# Patient Record
Sex: FEMALE | Race: WHITE | NOT HISPANIC OR LATINO | Employment: OTHER | URBAN - METROPOLITAN AREA
[De-identification: names, ages, dates, MRNs, and addresses within clinical notes are randomized per-mention and may not be internally consistent; named-entity substitution may affect disease eponyms.]

---

## 2017-02-14 ENCOUNTER — HISTORICAL (OUTPATIENT)
Dept: RADIOLOGY | Facility: HOSPITAL | Age: 67
End: 2017-02-14

## 2017-03-08 ENCOUNTER — HISTORICAL (OUTPATIENT)
Dept: RADIOLOGY | Facility: HOSPITAL | Age: 67
End: 2017-03-08

## 2017-05-08 ENCOUNTER — HISTORICAL (OUTPATIENT)
Dept: RADIOLOGY | Facility: HOSPITAL | Age: 67
End: 2017-05-08

## 2017-05-08 LAB
BUN SERPL-MCNC: 10 MG/DL (ref 7–18)
CREAT SERPL-MCNC: 0.79 MG/DL (ref 0.6–1.3)

## 2017-05-19 ENCOUNTER — HISTORICAL (OUTPATIENT)
Dept: LAB | Facility: HOSPITAL | Age: 67
End: 2017-05-19

## 2017-05-19 LAB
ALBUMIN SERPL-MCNC: 4.2 GM/DL (ref 3.4–5)
ALP SERPL-CCNC: 68 UNIT/L (ref 46–116)
ALT SERPL-CCNC: 23 UNIT/L (ref 12–78)
AST SERPL-CCNC: 25 UNIT/L (ref 15–37)
BILIRUB SERPL-MCNC: 0.5 MG/DL (ref 0.2–1)
BILIRUBIN DIRECT+TOT PNL SERPL-MCNC: 0.13 MG/DL (ref 0–0.2)
BILIRUBIN DIRECT+TOT PNL SERPL-MCNC: 0.37 MG/DL (ref 0–0.8)
BUN SERPL-MCNC: 9 MG/DL (ref 7–18)
CALCIUM SERPL-MCNC: 9.9 MG/DL (ref 8.5–10.1)
CHLORIDE SERPL-SCNC: 106 MMOL/L (ref 98–107)
CHOLEST SERPL-MCNC: 172 MG/DL (ref 0–200)
CHOLEST/HDLC SERPL: 2.2 {RATIO} (ref 0–4)
CO2 SERPL-SCNC: 32 MMOL/L (ref 21–32)
CREAT SERPL-MCNC: 0.81 MG/DL (ref 0.6–1.3)
DEPRECATED CALCIDIOL+CALCIFEROL SERPL-MC: 36.12 NG/ML (ref 30–80)
EST. AVERAGE GLUCOSE BLD GHB EST-MCNC: 117 MG/DL
FT4I SERPL CALC-MCNC: 3
GLUCOSE SERPL-MCNC: 99 MG/DL (ref 74–106)
HBA1C MFR BLD: 5.7 % (ref 4.5–6.2)
HDLC SERPL-MCNC: 79 MG/DL (ref 40–60)
LDLC SERPL CALC-MCNC: 75 MG/DL (ref 0–129)
POTASSIUM SERPL-SCNC: 4.5 MMOL/L (ref 3.5–5.1)
PROT SERPL-MCNC: 7.3 GM/DL (ref 6.4–8.2)
SODIUM SERPL-SCNC: 144 MMOL/L (ref 136–145)
T3RU NFR SERPL: 33 % (ref 31–39)
T4 SERPL-MCNC: 9.1 MCG/DL (ref 4.7–13.3)
TRIGL SERPL-MCNC: 88 MG/DL
TSH SERPL-ACNC: 1.41 MIU/ML (ref 0.36–3.74)
VLDLC SERPL CALC-MCNC: 18 MG/DL

## 2017-06-29 ENCOUNTER — HISTORICAL (OUTPATIENT)
Dept: RADIOLOGY | Facility: HOSPITAL | Age: 67
End: 2017-06-29

## 2017-09-27 ENCOUNTER — HISTORICAL (OUTPATIENT)
Dept: LAB | Facility: HOSPITAL | Age: 67
End: 2017-09-27

## 2017-09-27 LAB
ALBUMIN SERPL-MCNC: 3.7 GM/DL (ref 3.4–5)
ALP SERPL-CCNC: 65 UNIT/L (ref 46–116)
ALT SERPL-CCNC: 27 UNIT/L (ref 12–78)
AST SERPL-CCNC: 23 UNIT/L (ref 15–37)
BILIRUB SERPL-MCNC: 0.2 MG/DL (ref 0.2–1)
BILIRUBIN DIRECT+TOT PNL SERPL-MCNC: 0.09 MG/DL (ref 0–0.2)
BILIRUBIN DIRECT+TOT PNL SERPL-MCNC: 0.11 MG/DL (ref 0–0.8)
BUN SERPL-MCNC: 9.7 MG/DL (ref 7–18)
CALCIUM SERPL-MCNC: 9.3 MG/DL (ref 8.5–10.1)
CHLORIDE SERPL-SCNC: 104 MMOL/L (ref 98–107)
CHOLEST SERPL-MCNC: 181 MG/DL (ref 0–200)
CHOLEST/HDLC SERPL: 2.2 {RATIO} (ref 0–4)
CO2 SERPL-SCNC: 31 MMOL/L (ref 21–32)
CREAT SERPL-MCNC: 0.76 MG/DL (ref 0.6–1.3)
DEPRECATED CALCIDIOL+CALCIFEROL SERPL-MC: 36.04 NG/ML (ref 30–80)
ERYTHROCYTE [DISTWIDTH] IN BLOOD BY AUTOMATED COUNT: 14.8 % (ref 11.5–17)
EST. AVERAGE GLUCOSE BLD GHB EST-MCNC: 120 MG/DL
GLUCOSE SERPL-MCNC: 101 MG/DL (ref 74–106)
HBA1C MFR BLD: 5.8 % (ref 4.5–6.2)
HCT VFR BLD AUTO: 41.3 % (ref 37–47)
HDLC SERPL-MCNC: 83 MG/DL (ref 40–60)
HGB BLD-MCNC: 13.7 GM/DL (ref 12–16)
LDLC SERPL CALC-MCNC: 82 MG/DL (ref 0–129)
MCH RBC QN AUTO: 30.8 PG (ref 27–31)
MCHC RBC AUTO-ENTMCNC: 33.3 GM/DL (ref 33–36)
MCV RBC AUTO: 92.7 FL (ref 80–94)
PLATELET # BLD AUTO: 251 X10(3)/MCL (ref 130–400)
PMV BLD AUTO: 8.8 FL (ref 7.4–10.4)
POTASSIUM SERPL-SCNC: 4.3 MMOL/L (ref 3.5–5.1)
PROT SERPL-MCNC: 7.2 GM/DL (ref 6.4–8.2)
RBC # BLD AUTO: 4.45 X10(6)/MCL (ref 4.2–5.4)
SODIUM SERPL-SCNC: 143 MMOL/L (ref 136–145)
TRIGL SERPL-MCNC: 81 MG/DL
TSH SERPL-ACNC: 1.3 MIU/ML (ref 0.36–3.74)
VLDLC SERPL CALC-MCNC: 16 MG/DL
WBC # SPEC AUTO: 6.7 X10(3)/MCL (ref 4.5–11.5)

## 2017-10-10 ENCOUNTER — HISTORICAL (OUTPATIENT)
Dept: RADIOLOGY | Facility: HOSPITAL | Age: 67
End: 2017-10-10

## 2017-10-13 ENCOUNTER — HISTORICAL (OUTPATIENT)
Dept: RADIOLOGY | Facility: HOSPITAL | Age: 67
End: 2017-10-13

## 2017-12-01 ENCOUNTER — HISTORICAL (OUTPATIENT)
Dept: RADIOLOGY | Facility: HOSPITAL | Age: 67
End: 2017-12-01

## 2018-06-12 ENCOUNTER — HISTORICAL (OUTPATIENT)
Dept: LAB | Facility: HOSPITAL | Age: 68
End: 2018-06-12

## 2018-06-12 LAB
ALBUMIN SERPL-MCNC: 3.7 GM/DL (ref 3.4–5)
ALP SERPL-CCNC: 83 UNIT/L (ref 46–116)
ALT SERPL-CCNC: 21 UNIT/L (ref 12–78)
AST SERPL-CCNC: 22 UNIT/L (ref 15–37)
BILIRUB SERPL-MCNC: 0.3 MG/DL (ref 0.2–1)
BILIRUBIN DIRECT+TOT PNL SERPL-MCNC: 0.12 MG/DL (ref 0–0.2)
BILIRUBIN DIRECT+TOT PNL SERPL-MCNC: 0.18 MG/DL (ref 0–0.8)
BUN SERPL-MCNC: 10.3 MG/DL (ref 7–18)
CALCIUM SERPL-MCNC: 9.4 MG/DL (ref 8.5–10.1)
CHLORIDE SERPL-SCNC: 103 MMOL/L (ref 98–107)
CHOLEST SERPL-MCNC: 170 MG/DL (ref 0–200)
CHOLEST/HDLC SERPL: 2.2 {RATIO} (ref 0–4)
CO2 SERPL-SCNC: 33.8 MMOL/L (ref 21–32)
CREAT SERPL-MCNC: 0.73 MG/DL (ref 0.6–1.3)
CREAT/UREA NIT SERPL: 14
DEPRECATED CALCIDIOL+CALCIFEROL SERPL-MC: 32 NG/ML (ref 30–80)
GLUCOSE SERPL-MCNC: 104 MG/DL (ref 74–106)
HDLC SERPL-MCNC: 78 MG/DL (ref 40–60)
LDLC SERPL CALC-MCNC: 77 MG/DL (ref 0–129)
MAGNESIUM SERPL-MCNC: 2 MG/DL (ref 1.8–2.4)
POTASSIUM SERPL-SCNC: 4.5 MMOL/L (ref 3.5–5.1)
PROT SERPL-MCNC: 7.4 GM/DL (ref 6.4–8.2)
SODIUM SERPL-SCNC: 142 MMOL/L (ref 136–145)
T3RU NFR SERPL: 35 % (ref 31–39)
T4 SERPL-MCNC: 9.5 MCG/DL (ref 4.7–13.3)
TRIGL SERPL-MCNC: 73 MG/DL
TSH SERPL-ACNC: 1.17 MIU/ML (ref 0.36–3.74)
VLDLC SERPL CALC-MCNC: 15 MG/DL

## 2018-06-27 ENCOUNTER — HISTORICAL (OUTPATIENT)
Dept: ADMINISTRATIVE | Facility: HOSPITAL | Age: 68
End: 2018-06-27

## 2018-07-25 ENCOUNTER — HISTORICAL (OUTPATIENT)
Dept: ADMINISTRATIVE | Facility: HOSPITAL | Age: 68
End: 2018-07-25

## 2018-08-30 ENCOUNTER — HISTORICAL (OUTPATIENT)
Dept: RADIOLOGY | Facility: HOSPITAL | Age: 68
End: 2018-08-30

## 2019-02-07 ENCOUNTER — HISTORICAL (OUTPATIENT)
Dept: LAB | Facility: HOSPITAL | Age: 69
End: 2019-02-07

## 2019-02-07 LAB
ALBUMIN SERPL-MCNC: 3.8 GM/DL (ref 3.4–5)
ALBUMIN/GLOB SERPL: 1.1 RATIO (ref 1.1–2)
ALP SERPL-CCNC: 70 UNIT/L (ref 46–116)
ALT SERPL-CCNC: 22 UNIT/L (ref 12–78)
AST SERPL-CCNC: 21 UNIT/L (ref 15–37)
BILIRUB SERPL-MCNC: 0.3 MG/DL (ref 0.2–1)
BILIRUBIN DIRECT+TOT PNL SERPL-MCNC: 0.09 MG/DL (ref 0–0.2)
BILIRUBIN DIRECT+TOT PNL SERPL-MCNC: 0.21 MG/DL (ref 0–0.8)
BUN SERPL-MCNC: 9.5 MG/DL (ref 7–18)
CALCIUM SERPL-MCNC: 9.4 MG/DL (ref 8.5–10.1)
CHLORIDE SERPL-SCNC: 104 MMOL/L (ref 98–107)
CHOLEST SERPL-MCNC: 156 MG/DL (ref 0–200)
CHOLEST/HDLC SERPL: 2.4 {RATIO} (ref 0–4)
CO2 SERPL-SCNC: 24 MMOL/L (ref 21–32)
CREAT SERPL-MCNC: 0.73 MG/DL (ref 0.6–1.3)
CRP SERPL-MCNC: 0.9 MG/DL (ref 0–0.9)
DEPRECATED CALCIDIOL+CALCIFEROL SERPL-MC: 51.93 NG/ML (ref 30–80)
GLOBULIN SER-MCNC: 3.5 GM/DL (ref 2.4–3.5)
GLUCOSE SERPL-MCNC: 100 MG/DL (ref 74–106)
HDLC SERPL-MCNC: 65 MG/DL (ref 40–60)
LDLC SERPL CALC-MCNC: 73 MG/DL (ref 0–129)
POTASSIUM SERPL-SCNC: 4.2 MMOL/L (ref 3.5–5.1)
PROT SERPL-MCNC: 7.3 GM/DL (ref 6.4–8.2)
SODIUM SERPL-SCNC: 142 MMOL/L (ref 136–145)
T3RU NFR SERPL: 33 % (ref 31–39)
T4 SERPL-MCNC: 8.3 MCG/DL (ref 4.7–13.3)
TRIGL SERPL-MCNC: 89 MG/DL
TSH SERPL-ACNC: 0.27 MIU/ML (ref 0.36–3.74)
VIT B12 SERPL-MCNC: >2000 PG/ML (ref 193–986)
VLDLC SERPL CALC-MCNC: 18 MG/DL

## 2019-06-21 ENCOUNTER — HISTORICAL (OUTPATIENT)
Dept: LAB | Facility: HOSPITAL | Age: 69
End: 2019-06-21

## 2019-06-21 LAB
ABS NEUT (OLG): 5.78 X10(3)/MCL (ref 2.1–9.2)
ALBUMIN SERPL-MCNC: 3.9 GM/DL (ref 3.4–5)
ALP SERPL-CCNC: 71 UNIT/L (ref 46–116)
ALT SERPL-CCNC: 33 UNIT/L (ref 12–78)
APPEARANCE, UA: CLEAR
APTT PPP: 22.4 SECOND(S) (ref 24.5–34.9)
AST SERPL-CCNC: 29 UNIT/L (ref 15–37)
BACTERIA SPEC CULT: ABNORMAL
BASOPHILS # BLD AUTO: 0 X10(3)/MCL (ref 0–0.2)
BASOPHILS NFR BLD AUTO: 0 %
BILIRUB SERPL-MCNC: 0.3 MG/DL (ref 0.2–1)
BILIRUB UR QL STRIP: NEGATIVE
BILIRUBIN DIRECT+TOT PNL SERPL-MCNC: 0.1 MG/DL (ref 0–0.2)
BILIRUBIN DIRECT+TOT PNL SERPL-MCNC: 0.2 MG/DL (ref 0–0.8)
BUN SERPL-MCNC: 9.6 MG/DL (ref 7–18)
CALCIUM SERPL-MCNC: 9.6 MG/DL (ref 8.5–10.1)
CHLORIDE SERPL-SCNC: 104 MMOL/L (ref 98–107)
CO2 SERPL-SCNC: 34 MMOL/L (ref 21–32)
COLOR UR: YELLOW
CREAT SERPL-MCNC: 0.71 MG/DL (ref 0.6–1.3)
CREAT/UREA NIT SERPL: 14
EOSINOPHIL # BLD AUTO: 0.1 X10(3)/MCL (ref 0–0.9)
EOSINOPHIL NFR BLD AUTO: 1 %
ERYTHROCYTE [DISTWIDTH] IN BLOOD BY AUTOMATED COUNT: 15.2 % (ref 11.5–17)
EST. AVERAGE GLUCOSE BLD GHB EST-MCNC: 126 MG/DL
GLUCOSE (UA): NEGATIVE
GLUCOSE SERPL-MCNC: 95 MG/DL (ref 74–106)
HBA1C MFR BLD: 6 % (ref 4.5–6.2)
HCT VFR BLD AUTO: 39.8 % (ref 37–47)
HGB BLD-MCNC: 12.8 GM/DL (ref 12–16)
HGB UR QL STRIP: NEGATIVE
INR PPP: 0.97 (ref 2–3)
KETONES UR QL STRIP: NEGATIVE
LEUKOCYTE ESTERASE UR QL STRIP: ABNORMAL
LYMPHOCYTES # BLD AUTO: 1.4 X10(3)/MCL (ref 0.6–4.6)
LYMPHOCYTES NFR BLD AUTO: 17 %
MCH RBC QN AUTO: 30.1 PG (ref 27–31)
MCHC RBC AUTO-ENTMCNC: 32.2 GM/DL (ref 33–36)
MCV RBC AUTO: 93.6 FL (ref 80–94)
MONOCYTES # BLD AUTO: 0.7 X10(3)/MCL (ref 0.1–1.3)
MONOCYTES NFR BLD AUTO: 9 %
NEUTROPHILS # BLD AUTO: 5.78 X10(3)/MCL (ref 1.4–7.9)
NEUTROPHILS NFR BLD AUTO: 73 %
NITRITE UR QL STRIP: NEGATIVE
PH UR STRIP: 8.5 [PH] (ref 5–9)
PLATELET # BLD AUTO: 263 X10(3)/MCL (ref 130–400)
PMV BLD AUTO: 10.8 FL (ref 9.4–12.4)
POTASSIUM SERPL-SCNC: 4.8 MMOL/L (ref 3.5–5.1)
PROT SERPL-MCNC: 7.6 GM/DL (ref 6.4–8.2)
PROT UR QL STRIP: NEGATIVE
PROTHROMBIN TIME: 10.2 SECOND(S) (ref 9.3–11.4)
RBC # BLD AUTO: 4.25 X10(6)/MCL (ref 4.2–5.4)
RBC #/AREA URNS HPF: ABNORMAL /[HPF]
SODIUM SERPL-SCNC: 142 MMOL/L (ref 136–145)
SP GR UR STRIP: 1.01 (ref 1–1.03)
SQUAMOUS EPITHELIAL, UA: ABNORMAL
TSH SERPL-ACNC: 0.75 MIU/ML (ref 0.36–3.74)
UROBILINOGEN UR STRIP-ACNC: 0.2
WBC # SPEC AUTO: 8 X10(3)/MCL (ref 4.5–11.5)
WBC #/AREA URNS HPF: ABNORMAL /HPF

## 2019-09-03 ENCOUNTER — HISTORICAL (OUTPATIENT)
Dept: RADIOLOGY | Facility: HOSPITAL | Age: 69
End: 2019-09-03

## 2019-11-19 ENCOUNTER — HISTORICAL (OUTPATIENT)
Dept: RADIOLOGY | Facility: HOSPITAL | Age: 69
End: 2019-11-19

## 2020-02-28 ENCOUNTER — HISTORICAL (OUTPATIENT)
Dept: LAB | Facility: HOSPITAL | Age: 70
End: 2020-02-28

## 2020-02-28 LAB
APPEARANCE, UA: CLEAR
BACTERIA SPEC CULT: ABNORMAL
BILIRUB UR QL STRIP: NEGATIVE
COLOR UR: YELLOW
GLUCOSE (UA): NEGATIVE
HGB UR QL STRIP: NEGATIVE
KETONES UR QL STRIP: NEGATIVE
LEUKOCYTE ESTERASE UR QL STRIP: ABNORMAL
NITRITE UR QL STRIP: NEGATIVE
PH UR STRIP: 6.5 [PH] (ref 5–9)
PROT UR QL STRIP: NEGATIVE
RBC #/AREA URNS HPF: ABNORMAL /[HPF]
SP GR UR STRIP: 1.01 (ref 1–1.03)
SQUAMOUS EPITHELIAL, UA: ABNORMAL
UROBILINOGEN UR STRIP-ACNC: 0.2
WBC #/AREA URNS HPF: ABNORMAL /HPF

## 2020-07-16 ENCOUNTER — HISTORICAL (OUTPATIENT)
Dept: RADIOLOGY | Facility: HOSPITAL | Age: 70
End: 2020-07-16

## 2020-07-16 LAB
BUN SERPL-MCNC: 6 MG/DL (ref 7–18)
CREAT SERPL-MCNC: 0.62 MG/DL (ref 0.6–1.3)

## 2020-09-14 ENCOUNTER — HISTORICAL (OUTPATIENT)
Dept: LAB | Facility: HOSPITAL | Age: 70
End: 2020-09-14

## 2020-09-14 LAB
ALBUMIN SERPL-MCNC: 4.2 GM/DL (ref 3.4–5)
ALP SERPL-CCNC: 64 UNIT/L (ref 46–116)
ALT SERPL-CCNC: 30 UNIT/L (ref 12–78)
AST SERPL-CCNC: 29 UNIT/L (ref 15–37)
BILIRUB SERPL-MCNC: 0.4 MG/DL (ref 0.2–1)
BILIRUBIN DIRECT+TOT PNL SERPL-MCNC: 0.12 MG/DL (ref 0–0.2)
BILIRUBIN DIRECT+TOT PNL SERPL-MCNC: 0.28 MG/DL (ref 0–0.8)
BUN SERPL-MCNC: 8.4 MG/DL (ref 7–18)
CALCIUM SERPL-MCNC: 9.9 MG/DL (ref 8.5–10.1)
CHLORIDE SERPL-SCNC: 101 MMOL/L (ref 98–107)
CHOLEST SERPL-MCNC: 220 MG/DL (ref 0–200)
CHOLEST/HDLC SERPL: 2.8 {RATIO} (ref 0–4)
CO2 SERPL-SCNC: 37 MMOL/L (ref 21–32)
CREAT SERPL-MCNC: 0.83 MG/DL (ref 0.6–1.3)
CREAT/UREA NIT SERPL: 10 MG/DL (ref 12–14)
DEPRECATED CALCIDIOL+CALCIFEROL SERPL-MC: 78.1 NG/ML (ref 6.6–49.9)
ERYTHROCYTE [DISTWIDTH] IN BLOOD BY AUTOMATED COUNT: 14.2 % (ref 11.5–17)
EST. AVERAGE GLUCOSE BLD GHB EST-MCNC: 126 MG/DL
FT4I SERPL CALC-MCNC: 3.5
GLUCOSE SERPL-MCNC: 105 MG/DL (ref 74–106)
HBA1C MFR BLD: 6 % (ref 4.5–6.2)
HCT VFR BLD AUTO: 45.9 % (ref 37–47)
HDLC SERPL-MCNC: 78 MG/DL (ref 40–60)
HGB BLD-MCNC: 15.1 GM/DL (ref 12–16)
LDLC SERPL CALC-MCNC: 122 MG/DL (ref 0–129)
MCH RBC QN AUTO: 31.7 PG (ref 27–31)
MCHC RBC AUTO-ENTMCNC: 32.9 GM/DL (ref 33–36)
MCV RBC AUTO: 96.4 FL (ref 80–94)
PLATELET # BLD AUTO: 234 X10(3)/MCL (ref 130–400)
PMV BLD AUTO: 11 FL (ref 9.4–12.4)
POTASSIUM SERPL-SCNC: 4.4 MMOL/L (ref 3.5–5.1)
PROT SERPL-MCNC: 8 GM/DL (ref 6.4–8.2)
RBC # BLD AUTO: 4.76 X10(6)/MCL (ref 4.2–5.4)
SODIUM SERPL-SCNC: 141 MMOL/L (ref 136–145)
T3RU NFR SERPL: 33 % (ref 31–39)
T4 SERPL-MCNC: 10.6 MCG/DL (ref 4.7–13.3)
TRIGL SERPL-MCNC: 99 MG/DL
TSH SERPL-ACNC: 1.55 MIU/ML (ref 0.36–3.74)
VLDLC SERPL CALC-MCNC: 20 MG/DL
WBC # SPEC AUTO: 4.9 X10(3)/MCL (ref 4.5–11.5)

## 2020-10-29 ENCOUNTER — HISTORICAL (OUTPATIENT)
Dept: RADIOLOGY | Facility: HOSPITAL | Age: 70
End: 2020-10-29

## 2021-11-05 ENCOUNTER — HISTORICAL (OUTPATIENT)
Dept: RADIOLOGY | Facility: HOSPITAL | Age: 71
End: 2021-11-05

## 2022-04-11 ENCOUNTER — HISTORICAL (OUTPATIENT)
Dept: LAB | Facility: HOSPITAL | Age: 72
End: 2022-04-11

## 2022-04-11 LAB
ALBUMIN SERPL-MCNC: 3.8 G/DL (ref 3.4–4.8)
ALBUMIN/GLOB SERPL: 1.1 {RATIO} (ref 1.1–2)
ALP SERPL-CCNC: 53 U/L (ref 40–150)
ALT SERPL-CCNC: 21 U/L (ref 0–55)
AST SERPL-CCNC: 29 U/L (ref 5–34)
BILIRUB SERPL-MCNC: 0.3 MG/DL
BILIRUBIN DIRECT+TOT PNL SERPL-MCNC: 0.1 (ref 0–0.8)
BILIRUBIN DIRECT+TOT PNL SERPL-MCNC: 0.2 (ref 0–0.5)
BUN SERPL-MCNC: 7.3 MG/DL (ref 9.8–20.1)
CALCIUM SERPL-MCNC: 9.7 MG/DL (ref 8.7–10.5)
CHLORIDE SERPL-SCNC: 101 MMOL/L (ref 98–107)
CHOLEST SERPL-MCNC: 188 MG/DL
CHOLEST/HDLC SERPL: 3 {RATIO} (ref 0–5)
CO2 SERPL-SCNC: 32 MMOL/L (ref 23–31)
CREAT SERPL-MCNC: 0.72 MG/DL (ref 0.55–1.02)
DEPRECATED CALCIDIOL+CALCIFEROL SERPL-MC: 59.9 NG/ML (ref 30–80)
ERYTHROCYTE [DISTWIDTH] IN BLOOD BY AUTOMATED COUNT: 13.6 % (ref 11.5–17)
EST. AVERAGE GLUCOSE BLD GHB EST-MCNC: 119.8 MG/DL
GLOBULIN SER-MCNC: 3.6 G/DL (ref 2.4–3.5)
GLUCOSE SERPL-MCNC: 97 MG/DL (ref 82–115)
HBA1C MFR BLD: 5.8 %
HCT VFR BLD AUTO: 45.4 % (ref 37–47)
HDLC SERPL-MCNC: 69 MG/DL (ref 35–60)
HEMOLYSIS INTERF INDEX SERPL-ACNC: 6
HEMOLYSIS INTERF INDEX SERPL-ACNC: 6
HGB BLD-MCNC: 14.2 G/DL (ref 12–16)
ICTERIC INTERF INDEX SERPL-ACNC: 0
LDLC SERPL CALC-MCNC: 104 MG/DL (ref 50–140)
LIPEMIC INTERF INDEX SERPL-ACNC: 3
MAGNESIUM SERPL-MCNC: 2.2 MG/DL (ref 1.6–2.6)
MCH RBC QN AUTO: 30.4 PG (ref 27–31)
MCHC RBC AUTO-ENTMCNC: 31.3 G/DL (ref 33–36)
MCV RBC AUTO: 97.2 FL (ref 80–94)
PLATELET # BLD AUTO: 205 10*3/UL (ref 130–400)
PMV BLD AUTO: 10.8 FL (ref 9.4–12.4)
POTASSIUM SERPL-SCNC: 4.4 MMOL/L (ref 3.5–5.1)
PROT SERPL-MCNC: 7.4 G/DL (ref 5.8–7.6)
RBC # BLD AUTO: 4.67 10*6/UL (ref 4.2–5.4)
SODIUM SERPL-SCNC: 143 MMOL/L (ref 136–145)
T3RU NFR SERPL: 36.75 % (ref 31–39)
T4 SERPL-MCNC: 9.54 UG/DL (ref 4.87–11.72)
TRIGL SERPL-MCNC: 77 MG/DL (ref 37–140)
TSH SERPL-ACNC: 0.86 M[IU]/L (ref 0.35–4.94)
VLDLC SERPL CALC-MCNC: 15 MG/DL
WBC # SPEC AUTO: 4.3 10*3/UL (ref 4.5–11.5)

## 2022-04-30 NOTE — OP NOTE
Patient:   Omiara Brito            MRN: [id number]            FIN: [id number]               Age:   68 years     Sex:  Female     :  1950   Associated Diagnoses:   None   Author:   Cheikh Figueroa MD      Preoperative Diagnosis: Cataract Left eye    Postoperative Diagnosis: Cataract Left eye    Procedure: Phacoemulsification with intaocular lens implantations Left eye    Surgeon: Cheikh Figueroa MD    Assistant: LISA Wheat     Anestheisa: Topical    Complications: None    The patient was brought into the operating suite, where the patient was correctly identified as was the operative eye via timeout.  The patient was prepped and draped in a sterile ophthalmic fashion.  A lid speculum was placed in the operative eye and the microscope was brought into place.  A 1.0mm paracentesis was then made at 6 o'clock.  The anterior chamber was filled with Endocoat.  A temporal clear corneal incision was made with a 2.4 mm keratome.  A 6 mm corneal marking ring was used to frank the cornea centered over the visual axis.  A 5.00 mm continuous curvilinear capsulorhexis was fashioned using a cystotome and microcapsular forceps.  Hydrodissection and hydrodelineation was performed with upreserved 1% Xylocaine.  The nucleus was then phacoemulsified with the Abbott phacoemulsification hand-piece with a total of 3 EFX.  The cortex was then removed with the Mathew I/A hand-piece. An DAMON lens model ZCB00 with a power of 18.5 was placed in the capsular bag.  The Helon was then removed from the eye with the Mathew I/A hand piece.  The anterior chamber was inflated and the wounds were hydrated with BSS.  The wounds were checked with Weck-Sunita sponges and found to be watertight.  The lid speculum was removed and topical antibiotics were placed on the operative eye.  The patient was brought to PACU in good condition.        2018 @ [facility]

## 2022-04-30 NOTE — OP NOTE
Patient:   Omaira Brito            MRN: [id number]            FIN: [id number]               Age:   68 years     Sex:  Female     :  1950   Associated Diagnoses:   None   Author:   Cheikh Figueroa MD      Preoperative Diagnosis: Cataract Right eye    Postoperative Diagnosis: Cataract Right eye    Procedure: Phacoemulsification with intaocular lens implantations Right eye    Surgeon: Cheikh Figueroa MD    Assistant: LISA Wheat    Anestheisa: Topical    Complications: None    The patient was brought into the operating suite, where the patient was correctly identified as was the operative eye via timeout.  The patient was prepped and draped in a sterile ophthalmic fashion.  A lid speculum was placed in the operative eye and the microscope was brought into place.  A 1.0mm paracentesis was then made at 12 o'clock.  The anterior chamber was filled with Endocoat.  A temporal clear corneal incision was made with a 2.4 mm keratome.  A 6 mm corneal marking ring was used to frank the cornea centered over the visual axis.  A 5.00 mm continuous curvilinear capsulorhexis was fashioned using a cystotome and microcapsular forceps.  Hydrodissection and hydrodelineation was performed with upreserved 1% Xylocaine.  The nucleus was then phacoemulsified with the Abbott phacoemulsification hand-piece with a total of 11 EFX.  The cortex was then removed with the Mathew I/A hand-piece. An DAMON lens model ZCB00 with a power of 19.0 was placed in the capsular bag.  The Helon was then removed from the eye with the Mathew I/A hand piece. The anterior chamber was inflated and the wounds were hydrated with BSS.  The wounds were checked with Weck-Sunita sponges and found to be watertight.  The lid speculum was removed and topical antibiotics were placed on the operative eye.  The patient was brought to PACU in good condition.        2018 @ [facility]

## 2022-08-29 ENCOUNTER — OFFICE VISIT (OUTPATIENT)
Dept: ORTHOPEDICS | Facility: CLINIC | Age: 72
End: 2022-08-29
Payer: MEDICARE

## 2022-08-29 VITALS — HEIGHT: 59 IN | WEIGHT: 158 LBS | BODY MASS INDEX: 31.85 KG/M2

## 2022-08-29 DIAGNOSIS — M19.011 PRIMARY OSTEOARTHRITIS OF RIGHT SHOULDER: ICD-10-CM

## 2022-08-29 DIAGNOSIS — M75.41 SHOULDER IMPINGEMENT SYNDROME, RIGHT: ICD-10-CM

## 2022-08-29 DIAGNOSIS — M25.511 RIGHT SHOULDER PAIN, UNSPECIFIED CHRONICITY: Primary | ICD-10-CM

## 2022-08-29 PROCEDURE — 20610 DRAIN/INJ JOINT/BURSA W/O US: CPT | Mod: RT,,, | Performed by: ORTHOPAEDIC SURGERY

## 2022-08-29 PROCEDURE — 20610 LARGE JOINT ASPIRATION/INJECTION: R SUBACROMIAL BURSA: ICD-10-PCS | Mod: RT,,, | Performed by: ORTHOPAEDIC SURGERY

## 2022-08-29 PROCEDURE — 99203 OFFICE O/P NEW LOW 30 MIN: CPT | Mod: 25,,, | Performed by: ORTHOPAEDIC SURGERY

## 2022-08-29 PROCEDURE — 99203 PR OFFICE/OUTPT VISIT, NEW, LEVL III, 30-44 MIN: ICD-10-PCS | Mod: 25,,, | Performed by: ORTHOPAEDIC SURGERY

## 2022-08-29 RX ORDER — MECLIZINE HCL 25MG 25 MG/1
25 TABLET, CHEWABLE ORAL 3 TIMES DAILY PRN
COMMUNITY

## 2022-08-29 RX ORDER — LEVOTHYROXINE SODIUM 75 UG/1
75 TABLET ORAL
COMMUNITY

## 2022-08-29 RX ORDER — PANTOPRAZOLE SODIUM 40 MG/1
40 TABLET, DELAYED RELEASE ORAL 2 TIMES DAILY
COMMUNITY

## 2022-08-29 RX ORDER — NAPROXEN 500 MG/1
500 TABLET ORAL DAILY
COMMUNITY
Start: 2022-04-08

## 2022-08-29 RX ORDER — FLUTICASONE PROPIONATE 50 MCG
1 SPRAY, SUSPENSION (ML) NASAL
COMMUNITY

## 2022-08-29 RX ORDER — BUSPIRONE HYDROCHLORIDE 15 MG/1
15 TABLET ORAL DAILY
COMMUNITY

## 2022-08-29 RX ORDER — BETAMETHASONE SODIUM PHOSPHATE AND BETAMETHASONE ACETATE 3; 3 MG/ML; MG/ML
12 INJECTION, SUSPENSION INTRA-ARTICULAR; INTRALESIONAL; INTRAMUSCULAR; SOFT TISSUE
Status: DISCONTINUED | OUTPATIENT
Start: 2022-08-29 | End: 2022-08-29 | Stop reason: HOSPADM

## 2022-08-29 RX ORDER — DOXEPIN HYDROCHLORIDE 25 MG/1
25 CAPSULE ORAL NIGHTLY
COMMUNITY
End: 2023-08-02

## 2022-08-29 RX ORDER — SIMVASTATIN 40 MG/1
40 TABLET, FILM COATED ORAL NIGHTLY
COMMUNITY
Start: 2022-03-03

## 2022-08-29 RX ORDER — LIDOCAINE HYDROCHLORIDE 20 MG/ML
3 INJECTION, SOLUTION INFILTRATION; PERINEURAL
Status: DISCONTINUED | OUTPATIENT
Start: 2022-08-29 | End: 2022-08-29 | Stop reason: HOSPADM

## 2022-08-29 RX ORDER — VENLAFAXINE HYDROCHLORIDE 150 MG/1
150 CAPSULE, EXTENDED RELEASE ORAL DAILY
COMMUNITY

## 2022-08-29 RX ORDER — HYOSCYAMINE SULFATE 0.12 MG/1
0.12 TABLET SUBLINGUAL DAILY PRN
COMMUNITY

## 2022-08-29 RX ORDER — CHLORPHENIRAMINE MALEATE 4 MG
4 TABLET ORAL DAILY
COMMUNITY
End: 2023-08-02

## 2022-08-29 RX ORDER — ESTRADIOL 1 MG/1
1 TABLET ORAL DAILY
COMMUNITY
Start: 2022-03-10

## 2022-08-29 RX ORDER — BALSALAZIDE DISODIUM 750 MG/1
1500 CAPSULE ORAL 3 TIMES DAILY
COMMUNITY

## 2022-08-29 RX ADMIN — BETAMETHASONE SODIUM PHOSPHATE AND BETAMETHASONE ACETATE 12 MG: 3; 3 INJECTION, SUSPENSION INTRA-ARTICULAR; INTRALESIONAL; INTRAMUSCULAR; SOFT TISSUE at 02:08

## 2022-08-29 RX ADMIN — LIDOCAINE HYDROCHLORIDE 3 MG: 20 INJECTION, SOLUTION INFILTRATION; PERINEURAL at 02:08

## 2022-08-29 NOTE — PROGRESS NOTES
Subjective:    CC: Pain of the Right Shoulder and Pain (R shoulder pain - previous RTC sx with dr diaz in 1980's - pt states that she has a lot of pain when lifting her arm - she states that she has pain with any outward motion of her shoulder - td)       HPI:  Patient comes in today for her 1st visit.  Patient complains of right shoulder pain for the last several years.  She states it is gradually getting worse.  She is not interested in a shoulder replacement.  He has had multiple surgeries in the past on her right shoulder.  She denies any new or specific trauma, she continues to have pain especially with overhead activities, limiting her motion.    ROS: Refer to HPI for pertinent ROS. All other 12 point systems negative.    Objective:    Physical Exam:  Patient is well-nourished and well-developed, in no apparent distress, pleasant and cooperative. Examination of the right upper extremity compartments are soft and warm.  Skin is intact. There are no signs or symptoms of DVT or infection.   Patient is tender to palpation along the  anterior lateral aspect .  Patient is able to forward flex and abduct to 110.  Positive Pennington and Neers, positive empty can, negative drop arm test. Negative sulcus sign. Stable to stressing. Neurovascularly intact distally.    Images:  X-rays two views of the right shoulder demonstrates severe glenohumeral arthritis with elevated humeral head shoulder impingement. Images Reviewed and discussed with patient.    Assessment:  1. Right shoulder pain, unspecified chronicity  - X-ray Shoulder 2 or More Views Right; Future    2. Primary osteoarthritis of right shoulder    3. Shoulder impingement syndrome, right  - Large Joint Aspiration/Injection: R subacromial bursa        Plan:  At this time we discussed her physical exam and x-ray findings.  We have discussed various treatment options.  She tolerated her subacromial injection very well the right shoulder.  We have discussed some  shoulder exercises, like see back in 6 weeks to see how she is progressing.    Follow UP: No follow-ups on file.    Large Joint Aspiration/Injection: R subacromial bursa    Date/Time: 8/29/2022 2:45 PM  Performed by: Sean Eric MD  Authorized by: Sean Eric MD     Consent Done?:  Yes (Verbal)  Indications:  Pain  Site marked: the procedure site was marked    Timeout: prior to procedure the correct patient, procedure, and site was verified    Prep: patient was prepped and draped in usual sterile fashion    Approach:  Posterior  Location:  Shoulder  Site:  R subacromial bursa  Medications:  12 mg betamethasone acetate-betamethasone sodium phosphate 6 mg/mL; 3 mg LIDOcaine HCL 20 mg/ml (2%) 20 mg/mL (2 %)  Patient tolerance:  Patient tolerated the procedure well with no immediate complications

## 2022-08-29 NOTE — PROCEDURES
Large Joint Aspiration/Injection: R subacromial bursa    Date/Time: 8/29/2022 2:45 PM  Performed by: Sean Eric MD  Authorized by: Sean Eric MD     Consent Done?:  Yes (Verbal)  Indications:  Pain  Site marked: the procedure site was marked    Timeout: prior to procedure the correct patient, procedure, and site was verified    Prep: patient was prepped and draped in usual sterile fashion    Approach:  Posterior  Location:  Shoulder  Site:  R subacromial bursa  Medications:  12 mg betamethasone acetate-betamethasone sodium phosphate 6 mg/mL; 3 mg LIDOcaine HCL 20 mg/ml (2%) 20 mg/mL (2 %)  Patient tolerance:  Patient tolerated the procedure well with no immediate complications

## 2022-09-07 ENCOUNTER — OFFICE VISIT (OUTPATIENT)
Dept: ORTHOPEDICS | Facility: CLINIC | Age: 72
End: 2022-09-07
Payer: MEDICARE

## 2022-09-07 VITALS
WEIGHT: 158 LBS | DIASTOLIC BLOOD PRESSURE: 67 MMHG | SYSTOLIC BLOOD PRESSURE: 128 MMHG | HEIGHT: 59 IN | BODY MASS INDEX: 31.85 KG/M2 | TEMPERATURE: 98 F | HEART RATE: 72 BPM

## 2022-09-07 DIAGNOSIS — M75.41 SHOULDER IMPINGEMENT SYNDROME, RIGHT: ICD-10-CM

## 2022-09-07 DIAGNOSIS — M19.011 PRIMARY OSTEOARTHRITIS OF RIGHT SHOULDER: Primary | ICD-10-CM

## 2022-09-07 PROCEDURE — 99213 OFFICE O/P EST LOW 20 MIN: CPT | Mod: ,,, | Performed by: ORTHOPAEDIC SURGERY

## 2022-09-07 PROCEDURE — 99213 PR OFFICE/OUTPT VISIT, EST, LEVL III, 20-29 MIN: ICD-10-PCS | Mod: ,,, | Performed by: ORTHOPAEDIC SURGERY

## 2022-09-07 NOTE — PROGRESS NOTES
Subjective:    CC: Pain of the Right Shoulder (Right shoulder pain Injection on 8/29/22 did not help Takes naproxen helps alittle  Home exercises unable to do increase pain )       HPI:  Patient returns today for repeat exam.  Patient injection in her right shoulder, subacromial space 2 weeks ago.  Patient had minimal relief.  She continues to have some pain.  She is not interested in surgery.    ROS: Refer to [facility] for pertinent ROS. All other 12 point systems negative.    Objective:    Physical Exam:  Patient is well-nourished and well-developed, in no apparent distress, pleasant and cooperative. Examination of the right upper extremity compartments are soft and warm.  Skin is intact. There are no signs or symptoms of DVT or infection.   Patient is tender to palpation along the  lateral aspect .  Patient is able to forward flex and abduct to 130.  Positive Pennington and Neers, positive empty can, questionable drop arm test. Negative sulcus sign. Stable to stressing. Neurovascularly intact distally.    Images: . Images Reviewed and discussed with patient.    Assessment:  1. Primary osteoarthritis of right shoulder  - Ambulatory referral/consult to Physical/Occupational Therapy; Future    2. Shoulder impingement syndrome, right  - Ambulatory referral/consult to Physical/Occupational Therapy; Future        Plan:  At this time we discussed her physical exam and previous imaging findings.  We have discussed her significant arthritis, elevated humeral head subacromial impingement.  We have discussed various treatment options., we will start with formal therapy, like see her back for his scheduled appointment 4 weeks    Follow UP: No follow-ups on file.

## 2022-10-12 ENCOUNTER — OFFICE VISIT (OUTPATIENT)
Dept: ORTHOPEDICS | Facility: CLINIC | Age: 72
End: 2022-10-12
Payer: MEDICARE

## 2022-10-12 VITALS
SYSTOLIC BLOOD PRESSURE: 120 MMHG | HEART RATE: 79 BPM | HEIGHT: 59 IN | BODY MASS INDEX: 31.85 KG/M2 | WEIGHT: 158 LBS | TEMPERATURE: 98 F | DIASTOLIC BLOOD PRESSURE: 69 MMHG

## 2022-10-12 DIAGNOSIS — M19.011 PRIMARY OSTEOARTHRITIS OF RIGHT SHOULDER: Primary | ICD-10-CM

## 2022-10-12 DIAGNOSIS — M75.41 SHOULDER IMPINGEMENT SYNDROME, RIGHT: ICD-10-CM

## 2022-10-12 PROCEDURE — 99213 PR OFFICE/OUTPT VISIT, EST, LEVL III, 20-29 MIN: ICD-10-PCS | Mod: ,,, | Performed by: ORTHOPAEDIC SURGERY

## 2022-10-12 PROCEDURE — 99213 OFFICE O/P EST LOW 20 MIN: CPT | Mod: ,,, | Performed by: ORTHOPAEDIC SURGERY

## 2022-10-12 NOTE — PROGRESS NOTES
Subjective:    CC: Pain of the Right Shoulder (R shoulder inj 8/29/22 - pt states that her shoulder is about 80% better. She states that she is in physical therapy. Taking Toradol and flexaril for pain)       HPI:  Patient returns today for repeat exam.  Patient states injection helped a lot.  She has been going to physical therapy making good progress.    ROS: Refer to HPI for pertinent ROS. All other 12 point systems negative.    Objective:    Physical Exam:  Patient is well-nourished and well-developed, in no apparent distress, pleasant and cooperative. Examination of the right upper extremity compartments are soft and warm.  Skin is intact. There are no signs or symptoms of DVT or infection.   Patient is tender to palpation along the  anterolateral aspect .  Patient is able to forward flex and abduct to 150.  Positive Pennington and Neers, positive empty can, negative drop arm test. Negative sulcus sign. Stable to stressing. Neurovascularly intact distally.    Images: . Images Reviewed and discussed with patient.    Assessment:  1. Primary osteoarthritis of right shoulder  - Ambulatory referral/consult to Physical/Occupational Therapy; Future    2. Shoulder impingement syndrome, right  - Ambulatory referral/consult to Physical/Occupational Therapy; Future        Plan:  At this time we discussed her physical exam and previous imaging findings.  She will continue physical therapy to regain her strength and motion.  I would like see her back in 2 months to see how she is progressing.    Follow UP: No follow-ups on file.

## 2022-11-02 DIAGNOSIS — Z12.31 SCREENING MAMMOGRAM FOR BREAST CANCER: Primary | ICD-10-CM

## 2022-11-21 ENCOUNTER — HOSPITAL ENCOUNTER (OUTPATIENT)
Dept: RADIOLOGY | Facility: HOSPITAL | Age: 72
Discharge: HOME | End: 2022-11-21
Attending: OBSTETRICS & GYNECOLOGY
Payer: MEDICARE

## 2022-11-21 DIAGNOSIS — Z12.31 SCREENING MAMMOGRAM FOR BREAST CANCER: ICD-10-CM

## 2022-11-21 PROCEDURE — 77063 MAMMO DIGITAL SCREENING BILAT WITH TOMO: ICD-10-PCS | Mod: 26,,, | Performed by: RADIOLOGY

## 2022-11-21 PROCEDURE — 77063 BREAST TOMOSYNTHESIS BI: CPT | Mod: TC

## 2022-11-21 PROCEDURE — 77067 MAMMO DIGITAL SCREENING BILAT WITH TOMO: ICD-10-PCS | Mod: 26,,, | Performed by: RADIOLOGY

## 2022-11-21 PROCEDURE — 77067 SCR MAMMO BI INCL CAD: CPT | Mod: TC

## 2022-11-21 PROCEDURE — 77067 SCR MAMMO BI INCL CAD: CPT | Mod: 26,,, | Performed by: RADIOLOGY

## 2022-11-21 PROCEDURE — 77063 BREAST TOMOSYNTHESIS BI: CPT | Mod: 26,,, | Performed by: RADIOLOGY

## 2022-11-22 ENCOUNTER — LAB VISIT (OUTPATIENT)
Dept: LAB | Facility: HOSPITAL | Age: 72
End: 2022-11-22
Attending: INTERNAL MEDICINE
Payer: MEDICARE

## 2022-11-22 DIAGNOSIS — Z82.49 FAMILY HISTORY OF ISCHEMIC HEART DISEASE: ICD-10-CM

## 2022-11-22 DIAGNOSIS — E03.9 MYXEDEMA HEART DISEASE: ICD-10-CM

## 2022-11-22 DIAGNOSIS — Z00.00 ROUTINE GENERAL MEDICAL EXAMINATION AT A HEALTH CARE FACILITY: ICD-10-CM

## 2022-11-22 DIAGNOSIS — I51.9 MYXEDEMA HEART DISEASE: ICD-10-CM

## 2022-11-22 DIAGNOSIS — E55.9 AVITAMINOSIS D: ICD-10-CM

## 2022-11-22 DIAGNOSIS — E78.00 PURE HYPERCHOLESTEROLEMIA: ICD-10-CM

## 2022-11-22 DIAGNOSIS — Z79.899 ENCOUNTER FOR LONG-TERM (CURRENT) USE OF OTHER MEDICATIONS: ICD-10-CM

## 2022-11-22 DIAGNOSIS — R53.83 FATIGUE, UNSPECIFIED TYPE: Primary | ICD-10-CM

## 2022-11-22 LAB
ALBUMIN SERPL-MCNC: 3.7 GM/DL (ref 3.4–4.8)
ALBUMIN/GLOB SERPL: 1 RATIO (ref 1.1–2)
ALP SERPL-CCNC: 53 UNIT/L (ref 40–150)
ALT SERPL-CCNC: 25 UNIT/L (ref 0–55)
AST SERPL-CCNC: 31 UNIT/L (ref 5–34)
BILIRUBIN DIRECT+TOT PNL SERPL-MCNC: 0.4 MG/DL
BUN SERPL-MCNC: 9.8 MG/DL (ref 9.8–20.1)
CALCIUM SERPL-MCNC: 9.8 MG/DL (ref 8.4–10.2)
CHLORIDE SERPL-SCNC: 100 MMOL/L (ref 98–107)
CHOLEST SERPL-MCNC: 181 MG/DL
CHOLEST/HDLC SERPL: 2 {RATIO} (ref 0–5)
CO2 SERPL-SCNC: 32 MMOL/L (ref 23–31)
CREAT SERPL-MCNC: 0.72 MG/DL (ref 0.55–1.02)
DEPRECATED CALCIDIOL+CALCIFEROL SERPL-MC: 56.1 NG/ML (ref 30–80)
ERYTHROCYTE [DISTWIDTH] IN BLOOD BY AUTOMATED COUNT: 13.7 % (ref 11.5–17)
EST. AVERAGE GLUCOSE BLD GHB EST-MCNC: 114 MG/DL
GFR SERPLBLD CREATININE-BSD FMLA CKD-EPI: >60 MLS/MIN/1.73/M2
GLOBULIN SER-MCNC: 3.6 GM/DL (ref 2.4–3.5)
GLUCOSE SERPL-MCNC: 113 MG/DL (ref 82–115)
HBA1C MFR BLD: 5.6 %
HCT VFR BLD AUTO: 44.1 % (ref 37–47)
HDLC SERPL-MCNC: 80 MG/DL (ref 35–60)
HGB BLD-MCNC: 14 GM/DL (ref 12–16)
LDLC SERPL CALC-MCNC: 85 MG/DL (ref 50–140)
MCH RBC QN AUTO: 30.8 PG (ref 27–31)
MCHC RBC AUTO-ENTMCNC: 31.7 MG/DL (ref 33–36)
MCV RBC AUTO: 97.1 FL (ref 80–94)
PLATELET # BLD AUTO: 239 X10(3)/MCL (ref 130–400)
PMV BLD AUTO: 9.5 FL (ref 7.4–10.4)
POTASSIUM SERPL-SCNC: 4.3 MMOL/L (ref 3.5–5.1)
PROT SERPL-MCNC: 7.3 GM/DL (ref 5.8–7.6)
RBC # BLD AUTO: 4.54 X10(6)/MCL (ref 4.2–5.4)
SODIUM SERPL-SCNC: 142 MMOL/L (ref 136–145)
TRIGL SERPL-MCNC: 82 MG/DL (ref 37–140)
TSH SERPL-ACNC: 0.83 UIU/ML (ref 0.35–4.94)
VLDLC SERPL CALC-MCNC: 16 MG/DL
WBC # SPEC AUTO: 5.3 X10(3)/MCL (ref 4.5–11.5)

## 2022-11-22 PROCEDURE — 82306 VITAMIN D 25 HYDROXY: CPT

## 2022-11-22 PROCEDURE — 80061 LIPID PANEL: CPT

## 2022-11-22 PROCEDURE — 84443 ASSAY THYROID STIM HORMONE: CPT

## 2022-11-22 PROCEDURE — 80053 COMPREHEN METABOLIC PANEL: CPT

## 2022-11-22 PROCEDURE — 85027 COMPLETE CBC AUTOMATED: CPT

## 2022-11-22 PROCEDURE — 83036 HEMOGLOBIN GLYCOSYLATED A1C: CPT

## 2022-11-22 PROCEDURE — 36415 COLL VENOUS BLD VENIPUNCTURE: CPT

## 2022-12-07 ENCOUNTER — OFFICE VISIT (OUTPATIENT)
Dept: ORTHOPEDICS | Facility: CLINIC | Age: 72
End: 2022-12-07
Payer: MEDICARE

## 2022-12-07 VITALS — HEIGHT: 59 IN | WEIGHT: 158 LBS | BODY MASS INDEX: 31.85 KG/M2

## 2022-12-07 DIAGNOSIS — M75.41 SHOULDER IMPINGEMENT SYNDROME, RIGHT: ICD-10-CM

## 2022-12-07 DIAGNOSIS — M19.011 PRIMARY OSTEOARTHRITIS OF RIGHT SHOULDER: Primary | ICD-10-CM

## 2022-12-07 PROCEDURE — 99213 OFFICE O/P EST LOW 20 MIN: CPT | Mod: ,,, | Performed by: ORTHOPAEDIC SURGERY

## 2022-12-07 PROCEDURE — 99213 PR OFFICE/OUTPT VISIT, EST, LEVL III, 20-29 MIN: ICD-10-PCS | Mod: ,,, | Performed by: ORTHOPAEDIC SURGERY

## 2022-12-07 NOTE — PROGRESS NOTES
"Subjective:    CC: Pain of the Right Shoulder and Pain (R shoulder pain - pt states that the therapy is no longer helping. the pain is effecting her sleep - pt is taking tramadol for the pain - td)       HPI:  Patient returns today for repeat exam.  She is presently with family.  Patient continues to have worsening pain about her right shoulder.  She is had a previous rotator cuff repair with Dr. Agudelo 20+ years ago.  She also has a significant history of severe arthritis in her right shoulder.  She remains to retained good motion though she has worsening pain.  She has failed conservative treatment including therapy and injections, she interested in surgical intervention.    ROS: Refer to [facility] for pertinent ROS. All other 12 point systems negative.    Objective:  Vitals:    12/07/22 1115   Weight: 71.7 kg (158 lb)   Height: 4' 11" (1.499 m)        Physical Exam:  Right upper extremity compartment soft and warm.  Skin is intact.  There is no signs symptoms of DVT or infection.  Her incision is well healed she is able to forward flex and abduct to 150° there is crepitance with motion she is stable to stressing negative sulcus negative for instability, positive Pennington sign, neurovascular intact distally.    Images: . Images Reviewed and discussed with patient.    Assessment:  1. Primary osteoarthritis of right shoulder    2. Shoulder impingement syndrome, right        Plan:  At this time we discussed her physical exam and previous imaging findings.  We have discussed her impingement, underlying arthritic changes.  We have discussed a reverse total shoulder.  We have discussed consultation with 1 of my partners.  She is interested in this.  We will set this up at her convenience.    Follow UP: No follow-ups on file.                "

## 2022-12-12 ENCOUNTER — OFFICE VISIT (OUTPATIENT)
Dept: ORTHOPEDICS | Facility: CLINIC | Age: 72
End: 2022-12-12
Payer: MEDICARE

## 2022-12-12 DIAGNOSIS — M12.811 ROTATOR CUFF ARTHROPATHY OF RIGHT SHOULDER: Primary | ICD-10-CM

## 2022-12-12 PROCEDURE — 99213 OFFICE O/P EST LOW 20 MIN: CPT | Mod: ,,, | Performed by: ORTHOPAEDIC SURGERY

## 2022-12-12 PROCEDURE — 99213 PR OFFICE/OUTPT VISIT, EST, LEVL III, 20-29 MIN: ICD-10-PCS | Mod: ,,, | Performed by: ORTHOPAEDIC SURGERY

## 2022-12-12 NOTE — PROGRESS NOTES
Chief Complaint:   Chief Complaint   Patient presents with    Right Shoulder - Pain    Pain     Referral from DR. Eric for Rt reverse total shoulder.pt states in pain, pt taking tramadol for pain pt tried ice but doesnt work.        Consulting Physician: No ref. provider found    History of present illness:    she is a pleasant 72 y.o. year old female who has had a longstanding history of right shoulder pain.  She is had a history of a rotator cuff repair in the 1980s.  She is had persistent pain around the shoulder.  She noticed the pain worse with activity.  It is somewhat better with rest.  She is tried anti-inflammatory medicines and prescription strengthening pain medicine without relief.  She is tried home exercise program now for greater than 3 months.  Unfortunately her pain and disability have persisted.    Past Medical History:   Diagnosis Date    HLD (hyperlipidemia)     Osteoarthritis     Thyroid disease        Past Surgical History:   Procedure Laterality Date    CHOLECYSTECTOMY         Current Outpatient Medications   Medication Sig    balsalazide (COLAZAL) 750 mg capsule Take 1,500 mg by mouth.    busPIRone (BUSPAR) 15 MG tablet Take 15 mg by mouth 3 (three) times daily.    chlorpheniramine (CHLOR-TRIMETON) 4 mg tablet Take 4 mg by mouth.    doxepin (SINEQUAN) 25 MG capsule Take 25 mg by mouth every evening.    estradioL (ESTRACE) 1 MG tablet Take 1 mg by mouth once daily.    fluticasone propionate (FLONASE) 50 mcg/actuation nasal spray 1 spray by Nasal route.    hyoscyamine (LEVSIN/SL) 0.125 mg Subl Take 0.125 mg by mouth daily as needed.    levothyroxine (SYNTHROID) 75 MCG tablet Take 75 mcg by mouth before breakfast.    meclizine (ANTIVERT) 25 MG tablet Take 25 mg by mouth 3 (three) times daily as needed.    naproxen (NAPROSYN) 500 MG tablet Take 500 mg by mouth once daily.    pantoprazole (PROTONIX) 40 MG tablet Take 40 mg by mouth 2 (two) times daily.    simvastatin (ZOCOR) 40 MG tablet Take  40 mg by mouth once daily.    venlafaxine (EFFEXOR-XR) 150 MG Cp24 Take 150 mg by mouth once daily.     No current facility-administered medications for this visit.       Review of patient's allergies indicates:   Allergen Reactions    Amoxicillin-pot clavulanate Diarrhea and Other (See Comments)     Cause erosion, severe bleeding       Codeine Itching    Diphenhydramine hcl Other (See Comments)     Other reaction(s): HYPERACTIVE  Hyperactivity, restless      Hydrocodone-acetaminophen Itching     mild       Family History   Problem Relation Age of Onset    Cancer Father        Social History     Socioeconomic History    Marital status: Single   Tobacco Use    Smoking status: Never    Smokeless tobacco: Never   Substance and Sexual Activity    Alcohol use: Yes    Drug use: Never    Sexual activity: Not Currently       Review of Systems:    Constitution:   Denies chills, fever, and sweats.  HENT:   Denies headaches or blurry vision.  Cardiovascular:  Denies chest pain or irregular heart beat.  Respiratory:   Denies cough or shortness of breath.  Gastrointestinal:  Denies abdominal pain, nausea, or vomiting.  Musculoskeletal:   Denies muscle cramps.  Neurological:   Denies dizziness or focal weakness.  Psychiatric/Behavior: Normal mental status.  Hematology/Lymph:  Denies bleeding problem or easy bruising/bleeding.  Skin:    Denies rash or suspicious lesions.    Examination:    Vital Signs:    Vitals:    12/12/22 1327   PainSc:   6       There is no height or weight on file to calculate BMI.    Constitution:   Well-developed, well nourished patient in no acute distress.  Neurological:   Alert and oriented x 3 and cooperative to examination.     Psychiatric/Behavior: Normal mental status.  Respiratory:   No shortness of breath.  Eyes:    Extraoccular muscles intact  Skin:    No scars, rash or suspicious lesions.    MSK:   Shoulder Exam:                   Right        Left  Skin:                                   Normal      Normal  AC joint tenderness:           None         None  Forward Flexion:                  90            180  Abduction:                            90           180  External Rotation:                 40              80  Internal Rotation:                80             80  Supraspinatus stress test: +           Neg  Hawkin's Impingement:     Neg           Neg  Neer Impingement:            Neg           Neg  Apprehension:                   Neg           Neg  [place]'s:                             +           Neg  Speed's test:                       +            Neg  Strength:  External Rotation:           5/5                5/5  Lift Off/belly press:          5/5                5/5    N-V status:                   Intact             Intact    C-spine: Normal ROM, NT      Imaging: X-rays show rotator cuff arthropathy       Assessment: Rotator cuff arthropathy of right shoulder  -     CT Shoulder Without Contrast Right; Future; Expected date: 12/12/2022        Plan:  CT scan, joint replacement class and we will give her a handout on some exercises to do preoperatively.  I will see her back couple weeks for surgical planning.

## 2022-12-28 ENCOUNTER — HOSPITAL ENCOUNTER (OUTPATIENT)
Dept: RADIOLOGY | Facility: HOSPITAL | Age: 72
Discharge: HOME | End: 2022-12-28
Attending: ORTHOPAEDIC SURGERY
Payer: MEDICARE

## 2022-12-28 ENCOUNTER — OFFICE VISIT (OUTPATIENT)
Dept: ORTHOPEDICS | Facility: CLINIC | Age: 72
End: 2022-12-28
Payer: MEDICARE

## 2022-12-28 DIAGNOSIS — M12.811 ROTATOR CUFF ARTHROPATHY OF RIGHT SHOULDER: Primary | ICD-10-CM

## 2022-12-28 DIAGNOSIS — Z01.810 PREOP CARDIOVASCULAR EXAM: ICD-10-CM

## 2022-12-28 DIAGNOSIS — M12.811 ROTATOR CUFF ARTHROPATHY OF RIGHT SHOULDER: ICD-10-CM

## 2022-12-28 PROCEDURE — 99213 PR OFFICE/OUTPT VISIT, EST, LEVL III, 20-29 MIN: ICD-10-PCS | Mod: ,,, | Performed by: ORTHOPAEDIC SURGERY

## 2022-12-28 PROCEDURE — 71046 X-RAY EXAM CHEST 2 VIEWS: CPT | Mod: TC

## 2022-12-28 PROCEDURE — 99213 OFFICE O/P EST LOW 20 MIN: CPT | Mod: ,,, | Performed by: ORTHOPAEDIC SURGERY

## 2022-12-28 RX ORDER — GABAPENTIN 100 MG/1
300 CAPSULE ORAL
Status: CANCELLED | OUTPATIENT
Start: 2022-12-28

## 2022-12-28 RX ORDER — GABAPENTIN 100 MG/1
600 CAPSULE ORAL
Status: CANCELLED | OUTPATIENT
Start: 2022-12-28

## 2022-12-28 RX ORDER — SCOLOPAMINE TRANSDERMAL SYSTEM 1 MG/1
1 PATCH, EXTENDED RELEASE TRANSDERMAL ONCE AS NEEDED
Status: CANCELLED | OUTPATIENT
Start: 2022-12-28 | End: 2034-05-26

## 2022-12-28 RX ORDER — SODIUM CHLORIDE, SODIUM GLUCONATE, SODIUM ACETATE, POTASSIUM CHLORIDE AND MAGNESIUM CHLORIDE 30; 37; 368; 526; 502 MG/100ML; MG/100ML; MG/100ML; MG/100ML; MG/100ML
INJECTION, SOLUTION INTRAVENOUS CONTINUOUS
Status: CANCELLED | OUTPATIENT
Start: 2022-12-28

## 2022-12-28 RX ORDER — ACETAMINOPHEN 500 MG
1000 TABLET ORAL
Status: CANCELLED | OUTPATIENT
Start: 2022-12-28

## 2022-12-28 RX ORDER — KETOROLAC TROMETHAMINE 10 MG/1
10 TABLET, FILM COATED ORAL ONCE
Status: CANCELLED | OUTPATIENT
Start: 2022-12-28 | End: 2023-01-02

## 2022-12-28 RX ORDER — TRANEXAMIC ACID 650 MG/1
1950 TABLET ORAL
Status: CANCELLED | OUTPATIENT
Start: 2022-12-28 | End: 2022-12-28

## 2022-12-28 NOTE — PROGRESS NOTES
Chief Complaint:   Chief Complaint   Patient presents with    Right Shoulder - Results    Results     right shoulder MRI results, pt states nothing has changed since her last appt. Unable to obatin vitals       Consulting Physician: No ref. provider found    History of present illness:    she is a pleasant 72 y.o. year old female who has had a longstanding history of right shoulder pain.  She is had a history of a rotator cuff repair in the 1980s.  She is had persistent pain around the shoulder.  She noticed the pain worse with activity.  It is somewhat better with rest.  She is tried anti-inflammatory medicines and prescription strengthening pain medicine without relief.  She is tried home exercise program now for greater than 3 months.  Unfortunately her pain and disability have persisted.    She returns today status post CT scan.    Past Medical History:   Diagnosis Date    HLD (hyperlipidemia)     Osteoarthritis     Thyroid disease        Past Surgical History:   Procedure Laterality Date    CHOLECYSTECTOMY         Current Outpatient Medications   Medication Sig    balsalazide (COLAZAL) 750 mg capsule Take 1,500 mg by mouth.    busPIRone (BUSPAR) 15 MG tablet Take 15 mg by mouth 3 (three) times daily.    chlorpheniramine (CHLOR-TRIMETON) 4 mg tablet Take 4 mg by mouth.    doxepin (SINEQUAN) 25 MG capsule Take 25 mg by mouth every evening.    estradioL (ESTRACE) 1 MG tablet Take 1 mg by mouth once daily.    fluticasone propionate (FLONASE) 50 mcg/actuation nasal spray 1 spray by Nasal route.    hyoscyamine (LEVSIN/SL) 0.125 mg Subl Take 0.125 mg by mouth daily as needed.    levothyroxine (SYNTHROID) 75 MCG tablet Take 75 mcg by mouth before breakfast.    meclizine (ANTIVERT) 25 MG tablet Take 25 mg by mouth 3 (three) times daily as needed.    naproxen (NAPROSYN) 500 MG tablet Take 500 mg by mouth once daily.    pantoprazole (PROTONIX) 40 MG tablet Take 40 mg by mouth 2 (two) times daily.    simvastatin (ZOCOR) 40  MG tablet Take 40 mg by mouth once daily.    venlafaxine (EFFEXOR-XR) 150 MG Cp24 Take 150 mg by mouth once daily.     No current facility-administered medications for this visit.       Review of patient's allergies indicates:   Allergen Reactions    Amoxicillin-pot clavulanate Diarrhea and Other (See Comments)     Cause erosion, severe bleeding       Codeine Itching    Diphenhydramine hcl Other (See Comments)     Other reaction(s): HYPERACTIVE  Hyperactivity, restless      Hydrocodone-acetaminophen Itching     mild       Family History   Problem Relation Age of Onset    Cancer Father        Social History     Socioeconomic History    Marital status: Single   Tobacco Use    Smoking status: Never    Smokeless tobacco: Never   Substance and Sexual Activity    Alcohol use: Yes    Drug use: Never    Sexual activity: Not Currently       Review of Systems:    Constitution:   Denies chills, fever, and sweats.  HENT:   Denies headaches or blurry vision.  Cardiovascular:  Denies chest pain or irregular heart beat.  Respiratory:   Denies cough or shortness of breath.  Gastrointestinal:  Denies abdominal pain, nausea, or vomiting.  Musculoskeletal:   Denies muscle cramps.  Neurological:   Denies dizziness or focal weakness.  Psychiatric/Behavior: Normal mental status.  Hematology/Lymph:  Denies bleeding problem or easy bruising/bleeding.  Skin:    Denies rash or suspicious lesions.    Examination:    Vital Signs:    There were no vitals filed for this visit.      There is no height or weight on file to calculate BMI.    Constitution:   Well-developed, well nourished patient in no acute distress.  Neurological:   Alert and oriented x 3 and cooperative to examination.     Psychiatric/Behavior: Normal mental status.  Respiratory:   No shortness of breath.  Eyes:    Extraoccular muscles intact  Skin:    No scars, rash or suspicious lesions.    MSK:   Shoulder Exam:                   Right        Left  Skin:                                    Normal     Normal  AC joint tenderness:           None         None  Forward Flexion:                  90            180  Abduction:                            90           180  External Rotation:                 40              80  Internal Rotation:                80             80  Supraspinatus stress test: +           Neg  Hawkin's Impingement:     Neg           Neg  Neer Impingement:            Neg           Neg  Apprehension:                   Neg           Neg  [place]'s:                             +           Neg  Speed's test:                       +            Neg  Strength:  External Rotation:           5/5                5/5  Lift Off/belly press:          5/5                5/5    N-V status:                   Intact             Intact    C-spine: Normal ROM, NT      Imaging: X-rays show rotator cuff arthropathy       Assessment: Rotator cuff arthropathy of right shoulder        Plan:  I have recommended surgical intervention:  Right reverse total shoulder arthroplasty.  We discussed the details of the procedure and expected postoperative course.  We discussed the benefits of surgery which we did decrease her pain and increase her function.  We discussed the risks of surgery which is small but could be significant if she has continued pain, stiffness, or infection.  After discussion she would like to proceed.  Plans for surgery January 24

## 2023-01-03 ENCOUNTER — PATIENT MESSAGE (OUTPATIENT)
Dept: SURGERY | Facility: HOSPITAL | Age: 73
End: 2023-01-03
Payer: MEDICARE

## 2023-01-18 ENCOUNTER — ANESTHESIA EVENT (OUTPATIENT)
Dept: SURGERY | Facility: HOSPITAL | Age: 73
End: 2023-01-18
Payer: MEDICARE

## 2023-01-18 RX ORDER — CALCIUM CARBONATE 500(1250)
1 TABLET,CHEWABLE ORAL 2 TIMES DAILY
COMMUNITY

## 2023-01-18 RX ORDER — VITAMIN E 268 MG
400 CAPSULE ORAL NIGHTLY
COMMUNITY

## 2023-01-18 RX ORDER — ASCORBIC ACID 500 MG
500 TABLET ORAL NIGHTLY
COMMUNITY

## 2023-01-18 RX ORDER — WITCH HAZEL 50 %
2500 PADS, MEDICATED (EA) TOPICAL
COMMUNITY

## 2023-01-18 RX ORDER — SUCRALFATE 1 G/1
1 TABLET ORAL 2 TIMES DAILY
COMMUNITY

## 2023-01-18 RX ORDER — DOXEPIN HYDROCHLORIDE 100 MG/1
200 CAPSULE ORAL NIGHTLY
COMMUNITY

## 2023-01-18 RX ORDER — CETIRIZINE HYDROCHLORIDE 10 MG/1
10 TABLET ORAL DAILY
COMMUNITY

## 2023-01-18 RX ORDER — ACETAMINOPHEN 500 MG
5000 TABLET ORAL DAILY
COMMUNITY

## 2023-01-19 ENCOUNTER — TELEPHONE (OUTPATIENT)
Dept: ORTHOPEDICS | Facility: CLINIC | Age: 73
End: 2023-01-19
Payer: MEDICARE

## 2023-01-19 NOTE — TELEPHONE ENCOUNTER
FYI: Having surgery 1/24/23- Rt RTSA.  Called to let us know she had a tooth pulled on Tuesday and has abcess. She is on antibiotics. Okay to proceed.

## 2023-01-22 ENCOUNTER — PATIENT MESSAGE (OUTPATIENT)
Dept: ADMINISTRATIVE | Facility: OTHER | Age: 73
End: 2023-01-22
Payer: MEDICARE

## 2023-01-24 ENCOUNTER — ANESTHESIA (OUTPATIENT)
Dept: SURGERY | Facility: HOSPITAL | Age: 73
End: 2023-01-24
Payer: MEDICARE

## 2023-02-04 ENCOUNTER — PATIENT MESSAGE (OUTPATIENT)
Dept: ADMINISTRATIVE | Facility: OTHER | Age: 73
End: 2023-02-04
Payer: MEDICARE

## 2023-02-06 ENCOUNTER — PATIENT MESSAGE (OUTPATIENT)
Dept: ADMINISTRATIVE | Facility: OTHER | Age: 73
End: 2023-02-06
Payer: MEDICARE

## 2023-02-06 NOTE — H&P
Chief Complaint:    Right Shoulder - Results    Results       right shoulder MRI results, pt states nothing has changed since her last appt. Unable to obatin vitals          History of present illness:    she is a pleasant 72 y.o. year old female who has had a longstanding history of right shoulder pain.  She is had a history of a rotator cuff repair in the 1980s.  She is had persistent pain around the shoulder.  She noticed the pain worse with activity.  It is somewhat better with rest.  She is tried anti-inflammatory medicines and prescription strengthening pain medicine without relief.  She is tried home exercise program now for greater than 3 months.  Unfortunately her pain and disability have persisted.    She returns today status post CT scan.    Past Medical History:   Diagnosis Date    HLD (hyperlipidemia)     Osteoarthritis     Thyroid disease        Past Surgical History:   Procedure Laterality Date    ANTERIOR CERVICAL DISCECTOMY W/ FUSION      APPENDECTOMY      BACK SURGERY      fusion    BUNIONECTOMY Bilateral     BUNIONECTOMY Left     with foot reconstruction    BUNIONECTOMY Right     CARPAL TUNNEL RELEASE Bilateral     CATARACT EXTRACTION W/  INTRAOCULAR LENS IMPLANT Bilateral     DILATION AND CURETTAGE OF UTERUS      HYSTERECTOMY      JOINT REPLACEMENT Left     total knee    LAPAROSCOPIC REPAIR OF HIATAL HERNIA      REDUCTION OF BOTH BREASTS      with abdominoplasty    SHOULDER ARTHROSCOPY W/ ROTATOR CUFF REPAIR Bilateral     TONSILLECTOMY      adenoidectomy    TOTAL KNEE ARTHROPLASTY Right        No current facility-administered medications for this encounter.     Current Outpatient Medications   Medication Sig    ascorbic acid, vitamin C, (VITAMIN C) 500 MG tablet Take 500 mg by mouth nightly.    balsalazide (COLAZAL) 750 mg capsule Take 1,500 mg by mouth 3 (three) times daily.    busPIRone (BUSPAR) 15 MG tablet Take 15 mg by mouth once daily.    butalb/acetaminophen/caffeine (FIORICET ORAL) Take 1  tablet by mouth as needed.    calcium carbonate (OS-DEISY) 500 mg calcium (1,250 mg) chewable tablet Take 1 tablet by mouth 2 (two) times daily.    cetirizine (ZYRTEC) 10 MG tablet Take 10 mg by mouth once daily.    chlorpheniramine (CHLOR-TRIMETON) 4 mg tablet Take 4 mg by mouth once daily.    cholecalciferol, vitamin D3, 125 mcg (5,000 unit) Tab Take 5,000 Units by mouth once daily.    cyanocobalamin 2000 MCG tablet Take 2,500 mcg by mouth.    doxepin (SINEQUAN) 100 MG capsule Take 100 mg by mouth every evening.    doxepin (SINEQUAN) 25 MG capsule Take 25 mg by mouth every evening.    estradioL (ESTRACE) 1 MG tablet Take 1 mg by mouth once daily.    fluticasone propionate (FLONASE) 50 mcg/actuation nasal spray 1 spray by Nasal route as needed.    hyoscyamine (LEVSIN/SL) 0.125 mg Subl Take 0.125 mg by mouth daily as needed.    KRILL OIL ORAL Take 1 capsule by mouth every evening.    levothyroxine (SYNTHROID) 75 MCG tablet Take 75 mcg by mouth before breakfast.    meclizine (ANTIVERT) 25 MG tablet Take 25 mg by mouth 3 (three) times daily as needed.    naproxen (NAPROSYN) 500 MG tablet Take 500 mg by mouth once daily.    NON FORMULARY MEDICATION Take 2 tablets by mouth Daily.    pantoprazole (PROTONIX) 40 MG tablet Take 40 mg by mouth 2 (two) times daily.    POTASSIUM CHLORIDE ORAL Take by mouth nightly.    simvastatin (ZOCOR) 40 MG tablet Take 40 mg by mouth every evening.    sucralfate (CARAFATE) 1 gram tablet Take 1 g by mouth 2 (two) times daily.    triamterene/hydrochlorothiazid (MAXZIDE-25MG ORAL) Take 0.5 tablets by mouth Daily.    venlafaxine (EFFEXOR-XR) 150 MG Cp24 Take 150 mg by mouth once daily.    vit B12/levomefolate/vit B6/B2 (CEREFOLIN ORAL) Take 1 tablet by mouth Daily.    vitamin E 400 UNIT capsule Take 400 Units by mouth nightly.       Review of patient's allergies indicates:   Allergen Reactions    Amoxicillin-pot clavulanate Diarrhea and Other (See Comments)     Cause erosion, severe bleeding     "   Codeine Itching    Diphenhydramine hcl Other (See Comments)     Other reaction(s): HYPERACTIVE  Hyperactivity, restless      Hydrocodone-acetaminophen Itching     mild       Family History   Problem Relation Age of Onset    Cancer Father        Social History     Socioeconomic History    Marital status: Significant Other   Tobacco Use    Smoking status: Never    Smokeless tobacco: Never   Substance and Sexual Activity    Alcohol use: Not Currently    Drug use: Never    Sexual activity: Not Currently       Review of Systems:    Constitution:   Denies chills, fever, and sweats.  HENT:   Denies headaches or blurry vision.  Cardiovascular:  Denies chest pain or irregular heart beat.  Respiratory:   Denies cough or shortness of breath.  Gastrointestinal:  Denies abdominal pain, nausea, or vomiting.  Musculoskeletal:   Denies muscle cramps.  Neurological:   Denies dizziness or focal weakness.  Psychiatric/Behavior: Normal mental status.  Hematology/Lymph:  Denies bleeding problem or easy bruising/bleeding.  Skin:    Denies rash or suspicious lesions.    Examination:    Vital Signs:    Vitals:    01/18/23 0947   Weight: 72.6 kg (160 lb)   Height: 4' 11.02" (1.499 m)         Body mass index is 32.3 kg/m².    Constitution:   Well-developed, well nourished patient in no acute distress.  Neurological:   Alert and oriented x 3 and cooperative to examination.     Psychiatric/Behavior: Normal mental status.  Respiratory:   No shortness of breath.  Eyes:    Extraoccular muscles intact  Skin:    No scars, rash or suspicious lesions.    MSK:   Shoulder Exam:                   Right        Left  Skin:                                   Normal     Normal  AC joint tenderness:           None         None  Forward Flexion:                  90            180  Abduction:                            90           180  External Rotation:                 40              80  Internal Rotation:                80             80  Supraspinatus " stress test: +           Neg  Hawkin's Impingement:     Neg           Neg  Neer Impingement:            Neg           Neg  Apprehension:                   Neg           Neg  [place]'s:                             +           Neg  Speed's test:                       +            Neg  Strength:  External Rotation:           5/5                5/5  Lift Off/belly press:          5/5                5/5    N-V status:                   Intact             Intact    C-spine: Normal ROM, NT      Imaging: X-rays show rotator cuff arthropathy       Assessment: Rotator cuff arthropathy of right shoulder    Plan:  I have recommended surgical intervention:  Right reverse total shoulder arthroplasty.  We discussed the details of the procedure and expected postoperative course.  We discussed the benefits of surgery which we did decrease her pain and increase her function.  We discussed the risks of surgery which is small but could be significant if she has continued pain, stiffness, or infection.  After discussion she would like to proceed.  Plans for surgery February 7

## 2023-02-07 ENCOUNTER — HOSPITAL ENCOUNTER (OUTPATIENT)
Facility: HOSPITAL | Age: 73
Discharge: HOME | End: 2023-02-09
Attending: ORTHOPAEDIC SURGERY | Admitting: ORTHOPAEDIC SURGERY
Payer: MEDICARE

## 2023-02-07 DIAGNOSIS — M12.811 ROTATOR CUFF ARTHROPATHY OF RIGHT SHOULDER: ICD-10-CM

## 2023-02-07 LAB — POCT GLUCOSE: 90 MG/DL (ref 70–110)

## 2023-02-07 PROCEDURE — 97166 OT EVAL MOD COMPLEX 45 MIN: CPT

## 2023-02-07 PROCEDURE — 63600175 PHARM REV CODE 636 W HCPCS: Performed by: NURSE ANESTHETIST, CERTIFIED REGISTERED

## 2023-02-07 PROCEDURE — 23472 RECONSTRUCT SHOULDER JOINT: CPT | Mod: AS,RT,, | Performed by: NURSE PRACTITIONER

## 2023-02-07 PROCEDURE — 51798 US URINE CAPACITY MEASURE: CPT | Performed by: ORTHOPAEDIC SURGERY

## 2023-02-07 PROCEDURE — 25000003 PHARM REV CODE 250: Performed by: ORTHOPAEDIC SURGERY

## 2023-02-07 PROCEDURE — 25000003 PHARM REV CODE 250: Performed by: ANESTHESIOLOGY

## 2023-02-07 PROCEDURE — 30000890 HC MISC. SEND OUT TEST: Performed by: ORTHOPAEDIC SURGERY

## 2023-02-07 PROCEDURE — 23472 RECONSTRUCT SHOULDER JOINT: CPT | Mod: RT,,, | Performed by: ORTHOPAEDIC SURGERY

## 2023-02-07 PROCEDURE — C1713 ANCHOR/SCREW BN/BN,TIS/BN: HCPCS | Performed by: ORTHOPAEDIC SURGERY

## 2023-02-07 PROCEDURE — 63600175 PHARM REV CODE 636 W HCPCS: Performed by: ORTHOPAEDIC SURGERY

## 2023-02-07 PROCEDURE — 27201423 OPTIME MED/SURG SUP & DEVICES STERILE SUPPLY: Performed by: ORTHOPAEDIC SURGERY

## 2023-02-07 PROCEDURE — 30000890 SPECIMEN TO PATHOLOGY: Performed by: ORTHOPAEDIC SURGERY

## 2023-02-07 PROCEDURE — 63600175 PHARM REV CODE 636 W HCPCS

## 2023-02-07 PROCEDURE — 36000710: Performed by: ORTHOPAEDIC SURGERY

## 2023-02-07 PROCEDURE — 94799 UNLISTED PULMONARY SVC/PX: CPT

## 2023-02-07 PROCEDURE — 36000711: Performed by: ORTHOPAEDIC SURGERY

## 2023-02-07 PROCEDURE — C1769 GUIDE WIRE: HCPCS | Performed by: ORTHOPAEDIC SURGERY

## 2023-02-07 PROCEDURE — 88311 DECALCIFY TISSUE: CPT | Performed by: ORTHOPAEDIC SURGERY

## 2023-02-07 PROCEDURE — 25000003 PHARM REV CODE 250: Performed by: NURSE ANESTHETIST, CERTIFIED REGISTERED

## 2023-02-07 PROCEDURE — 97535 SELF CARE MNGMENT TRAINING: CPT

## 2023-02-07 PROCEDURE — G0378 HOSPITAL OBSERVATION PER HR: HCPCS

## 2023-02-07 PROCEDURE — 23472 PR RECONSTR TOTAL SHOULDER IMPLANT: ICD-10-PCS | Mod: RT,,, | Performed by: ORTHOPAEDIC SURGERY

## 2023-02-07 PROCEDURE — C1889 IMPLANT/INSERT DEVICE, NOC: HCPCS | Performed by: ORTHOPAEDIC SURGERY

## 2023-02-07 PROCEDURE — 37000009 HC ANESTHESIA EA ADD 15 MINS: Performed by: ORTHOPAEDIC SURGERY

## 2023-02-07 PROCEDURE — 88305 TISSUE EXAM BY PATHOLOGIST: CPT | Performed by: ORTHOPAEDIC SURGERY

## 2023-02-07 PROCEDURE — C1776 JOINT DEVICE (IMPLANTABLE): HCPCS | Performed by: ORTHOPAEDIC SURGERY

## 2023-02-07 PROCEDURE — 37000008 HC ANESTHESIA 1ST 15 MINUTES: Performed by: ORTHOPAEDIC SURGERY

## 2023-02-07 PROCEDURE — 23472 PR RECONSTR TOTAL SHOULDER IMPLANT: ICD-10-PCS | Mod: AS,RT,, | Performed by: NURSE PRACTITIONER

## 2023-02-07 PROCEDURE — 71000033 HC RECOVERY, INTIAL HOUR: Performed by: ORTHOPAEDIC SURGERY

## 2023-02-07 DEVICE — LOCKING SCREW TA6V Ø4.5MM L.20 MM
Type: IMPLANTABLE DEVICE | Site: SHOULDER | Status: FUNCTIONAL
Brand: HUMELOCK II CEMENTED REVERSIBLE SHOULDER

## 2023-02-07 DEVICE — HUMERIS STEM TA6V SIZE 09 CEMENTLESS TI/HA
Type: IMPLANTABLE DEVICE | Site: SHOULDER | Status: FUNCTIONAL
Brand: HUMERIS SHOULDER

## 2023-02-07 RX ORDER — ACETAMINOPHEN 10 MG/ML
1000 INJECTION, SOLUTION INTRAVENOUS ONCE
Status: COMPLETED | OUTPATIENT
Start: 2023-02-07 | End: 2023-02-07

## 2023-02-07 RX ORDER — SODIUM CHLORIDE 0.9 G/100ML
IRRIGANT IRRIGATION
Status: DISCONTINUED | OUTPATIENT
Start: 2023-02-07 | End: 2023-02-07 | Stop reason: HOSPADM

## 2023-02-07 RX ORDER — CEFAZOLIN SODIUM 2 G/100ML
2 INJECTION, SOLUTION INTRAVENOUS
Status: COMPLETED | OUTPATIENT
Start: 2023-02-07 | End: 2023-02-08

## 2023-02-07 RX ORDER — KETOROLAC TROMETHAMINE 30 MG/ML
INJECTION, SOLUTION INTRAMUSCULAR; INTRAVENOUS
Status: DISPENSED
Start: 2023-02-07 | End: 2023-02-07

## 2023-02-07 RX ORDER — BUPIVACAINE HYDROCHLORIDE 2.5 MG/ML
INJECTION, SOLUTION EPIDURAL; INFILTRATION; INTRACAUDAL
Status: DISPENSED
Start: 2023-02-07 | End: 2023-02-07

## 2023-02-07 RX ORDER — ATORVASTATIN CALCIUM 10 MG/1
20 TABLET, FILM COATED ORAL DAILY
Status: DISCONTINUED | OUTPATIENT
Start: 2023-02-08 | End: 2023-02-09 | Stop reason: HOSPADM

## 2023-02-07 RX ORDER — SUCRALFATE 1 G/1
1 TABLET ORAL 2 TIMES DAILY
Status: DISCONTINUED | OUTPATIENT
Start: 2023-02-07 | End: 2023-02-09 | Stop reason: HOSPADM

## 2023-02-07 RX ORDER — MIDAZOLAM HYDROCHLORIDE 1 MG/ML
INJECTION INTRAMUSCULAR; INTRAVENOUS
Status: DISCONTINUED | OUTPATIENT
Start: 2023-02-07 | End: 2023-02-07

## 2023-02-07 RX ORDER — CELECOXIB 200 MG/1
200 CAPSULE ORAL
Status: DISCONTINUED | OUTPATIENT
Start: 2023-02-07 | End: 2023-02-07

## 2023-02-07 RX ORDER — TRAMADOL HYDROCHLORIDE 50 MG/1
50 TABLET ORAL EVERY 4 HOURS PRN
Status: DISCONTINUED | OUTPATIENT
Start: 2023-02-07 | End: 2023-02-09 | Stop reason: HOSPADM

## 2023-02-07 RX ORDER — FENTANYL CITRATE 50 UG/ML
INJECTION, SOLUTION INTRAMUSCULAR; INTRAVENOUS
Status: DISCONTINUED | OUTPATIENT
Start: 2023-02-07 | End: 2023-02-07

## 2023-02-07 RX ORDER — DEXAMETHASONE SODIUM PHOSPHATE 4 MG/ML
INJECTION, SOLUTION INTRA-ARTICULAR; INTRALESIONAL; INTRAMUSCULAR; INTRAVENOUS; SOFT TISSUE
Status: DISCONTINUED | OUTPATIENT
Start: 2023-02-07 | End: 2023-02-07

## 2023-02-07 RX ORDER — HYDROCODONE BITARTRATE AND ACETAMINOPHEN 5; 325 MG/1; MG/1
1 TABLET ORAL
Status: CANCELLED | OUTPATIENT
Start: 2023-02-07

## 2023-02-07 RX ORDER — ROPIVACAINE HYDROCHLORIDE 5 MG/ML
INJECTION, SOLUTION EPIDURAL; INFILTRATION; PERINEURAL
Status: DISCONTINUED | OUTPATIENT
Start: 2023-02-07 | End: 2023-02-07 | Stop reason: HOSPADM

## 2023-02-07 RX ORDER — PHENYLEPHRINE HYDROCHLORIDE 10 MG/ML
INJECTION INTRAVENOUS
Status: DISCONTINUED | OUTPATIENT
Start: 2023-02-07 | End: 2023-02-07

## 2023-02-07 RX ORDER — DOXEPIN HYDROCHLORIDE 100 MG/1
100 CAPSULE ORAL NIGHTLY
Status: DISCONTINUED | OUTPATIENT
Start: 2023-02-07 | End: 2023-02-09 | Stop reason: HOSPADM

## 2023-02-07 RX ORDER — TRANEXAMIC ACID 650 MG/1
1950 TABLET ORAL
Status: COMPLETED | OUTPATIENT
Start: 2023-02-07 | End: 2023-02-07

## 2023-02-07 RX ORDER — HYDROCODONE BITARTRATE AND ACETAMINOPHEN 5; 325 MG/1; MG/1
1 TABLET ORAL EVERY 4 HOURS PRN
Status: DISCONTINUED | OUTPATIENT
Start: 2023-02-07 | End: 2023-02-07

## 2023-02-07 RX ORDER — ONDANSETRON 4 MG/1
4 TABLET, ORALLY DISINTEGRATING ORAL
Status: COMPLETED | OUTPATIENT
Start: 2023-02-07 | End: 2023-02-07

## 2023-02-07 RX ORDER — EPINEPHRINE 1 MG/ML
INJECTION, SOLUTION, CONCENTRATE INTRAVENOUS
Status: DISCONTINUED | OUTPATIENT
Start: 2023-02-07 | End: 2023-02-07 | Stop reason: HOSPADM

## 2023-02-07 RX ORDER — CETIRIZINE HYDROCHLORIDE 10 MG/1
10 TABLET ORAL DAILY
Status: DISCONTINUED | OUTPATIENT
Start: 2023-02-08 | End: 2023-02-09 | Stop reason: HOSPADM

## 2023-02-07 RX ORDER — HYOSCYAMINE SULFATE 0.125 MG
0.12 TABLET ORAL DAILY PRN
Status: DISCONTINUED | OUTPATIENT
Start: 2023-02-07 | End: 2023-02-09 | Stop reason: HOSPADM

## 2023-02-07 RX ORDER — OXYCODONE AND ACETAMINOPHEN 5; 325 MG/1; MG/1
1 TABLET ORAL EVERY 4 HOURS PRN
Status: DISCONTINUED | OUTPATIENT
Start: 2023-02-07 | End: 2023-02-09 | Stop reason: HOSPADM

## 2023-02-07 RX ORDER — SCOLOPAMINE TRANSDERMAL SYSTEM 1 MG/1
1 PATCH, EXTENDED RELEASE TRANSDERMAL ONCE AS NEEDED
Status: DISCONTINUED | OUTPATIENT
Start: 2023-02-07 | End: 2023-02-07

## 2023-02-07 RX ORDER — METOCLOPRAMIDE HYDROCHLORIDE 5 MG/ML
10 INJECTION INTRAMUSCULAR; INTRAVENOUS
Status: DISPENSED | OUTPATIENT
Start: 2023-02-07 | End: 2023-02-09

## 2023-02-07 RX ORDER — ACETAMINOPHEN 500 MG
1000 TABLET ORAL
Status: DISCONTINUED | OUTPATIENT
Start: 2023-02-07 | End: 2023-02-07

## 2023-02-07 RX ORDER — PROPOFOL 10 MG/ML
VIAL (ML) INTRAVENOUS
Status: DISCONTINUED | OUTPATIENT
Start: 2023-02-07 | End: 2023-02-07

## 2023-02-07 RX ORDER — ACETAMINOPHEN 500 MG
1000 TABLET ORAL
Status: COMPLETED | OUTPATIENT
Start: 2023-02-07 | End: 2023-02-07

## 2023-02-07 RX ORDER — KETOROLAC TROMETHAMINE 10 MG/1
10 TABLET, FILM COATED ORAL EVERY 6 HOURS
Status: DISCONTINUED | OUTPATIENT
Start: 2023-02-07 | End: 2023-02-09 | Stop reason: HOSPADM

## 2023-02-07 RX ORDER — GABAPENTIN 300 MG/1
300 CAPSULE ORAL
Status: DISCONTINUED | OUTPATIENT
Start: 2023-02-07 | End: 2023-02-07

## 2023-02-07 RX ORDER — SODIUM CHLORIDE 9 MG/ML
INJECTION, SOLUTION INTRAVENOUS CONTINUOUS
Status: DISCONTINUED | OUTPATIENT
Start: 2023-02-07 | End: 2023-02-09 | Stop reason: HOSPADM

## 2023-02-07 RX ORDER — AMOXICILLIN 250 MG
2 CAPSULE ORAL 2 TIMES DAILY
Status: DISCONTINUED | OUTPATIENT
Start: 2023-02-07 | End: 2023-02-09 | Stop reason: HOSPADM

## 2023-02-07 RX ORDER — MIDAZOLAM HYDROCHLORIDE 1 MG/ML
2 INJECTION INTRAMUSCULAR; INTRAVENOUS
Status: DISCONTINUED | OUTPATIENT
Start: 2023-02-07 | End: 2023-02-07

## 2023-02-07 RX ORDER — SODIUM CHLORIDE, SODIUM GLUCONATE, SODIUM ACETATE, POTASSIUM CHLORIDE AND MAGNESIUM CHLORIDE 30; 37; 368; 526; 502 MG/100ML; MG/100ML; MG/100ML; MG/100ML; MG/100ML
INJECTION, SOLUTION INTRAVENOUS CONTINUOUS
Status: DISCONTINUED | OUTPATIENT
Start: 2023-02-07 | End: 2023-02-07

## 2023-02-07 RX ORDER — MORPHINE SULFATE 4 MG/ML
4 INJECTION, SOLUTION INTRAMUSCULAR; INTRAVENOUS
Status: ACTIVE | OUTPATIENT
Start: 2023-02-07 | End: 2023-02-08

## 2023-02-07 RX ORDER — MORPHINE SULFATE 10 MG/ML
INJECTION INTRAMUSCULAR; INTRAVENOUS; SUBCUTANEOUS
Status: DISCONTINUED | OUTPATIENT
Start: 2023-02-07 | End: 2023-02-07 | Stop reason: HOSPADM

## 2023-02-07 RX ORDER — MIDAZOLAM HYDROCHLORIDE 1 MG/ML
INJECTION INTRAMUSCULAR; INTRAVENOUS
Status: COMPLETED
Start: 2023-02-07 | End: 2023-02-07

## 2023-02-07 RX ORDER — TALC
6 POWDER (GRAM) TOPICAL NIGHTLY PRN
Status: DISCONTINUED | OUTPATIENT
Start: 2023-02-07 | End: 2023-02-09 | Stop reason: HOSPADM

## 2023-02-07 RX ORDER — HYDROMORPHONE HYDROCHLORIDE 2 MG/ML
0.4 INJECTION, SOLUTION INTRAMUSCULAR; INTRAVENOUS; SUBCUTANEOUS EVERY 5 MIN PRN
Status: DISCONTINUED | OUTPATIENT
Start: 2023-02-07 | End: 2023-02-07

## 2023-02-07 RX ORDER — ONDANSETRON 2 MG/ML
4 INJECTION INTRAMUSCULAR; INTRAVENOUS EVERY 6 HOURS PRN
Status: DISCONTINUED | OUTPATIENT
Start: 2023-02-07 | End: 2023-02-09 | Stop reason: HOSPADM

## 2023-02-07 RX ORDER — ROPIVACAINE HYDROCHLORIDE 5 MG/ML
INJECTION, SOLUTION EPIDURAL; INFILTRATION; PERINEURAL
Status: DISPENSED
Start: 2023-02-07 | End: 2023-02-07

## 2023-02-07 RX ORDER — DOCUSATE SODIUM 100 MG/1
200 CAPSULE, LIQUID FILLED ORAL DAILY
Status: DISCONTINUED | OUTPATIENT
Start: 2023-02-08 | End: 2023-02-09 | Stop reason: HOSPADM

## 2023-02-07 RX ORDER — SODIUM CHLORIDE 0.9 % (FLUSH) 0.9 %
SYRINGE (ML) INJECTION
Status: DISPENSED
Start: 2023-02-07 | End: 2023-02-07

## 2023-02-07 RX ORDER — SODIUM CHLORIDE, SODIUM LACTATE, POTASSIUM CHLORIDE, CALCIUM CHLORIDE 600; 310; 30; 20 MG/100ML; MG/100ML; MG/100ML; MG/100ML
1000 INJECTION, SOLUTION INTRAVENOUS ONCE
Status: DISCONTINUED | OUTPATIENT
Start: 2023-02-07 | End: 2023-02-07

## 2023-02-07 RX ORDER — DOXEPIN HYDROCHLORIDE 25 MG/1
25 CAPSULE ORAL NIGHTLY
Status: DISCONTINUED | OUTPATIENT
Start: 2023-02-07 | End: 2023-02-09 | Stop reason: HOSPADM

## 2023-02-07 RX ORDER — MORPHINE SULFATE 10 MG/ML
INJECTION INTRAMUSCULAR; INTRAVENOUS; SUBCUTANEOUS
Status: DISPENSED
Start: 2023-02-07 | End: 2023-02-07

## 2023-02-07 RX ORDER — EPHEDRINE SULFATE 50 MG/ML
INJECTION, SOLUTION INTRAVENOUS
Status: DISCONTINUED | OUTPATIENT
Start: 2023-02-07 | End: 2023-02-07

## 2023-02-07 RX ORDER — PROCHLORPERAZINE EDISYLATE 5 MG/ML
5 INJECTION INTRAMUSCULAR; INTRAVENOUS EVERY 30 MIN PRN
Status: DISCONTINUED | OUTPATIENT
Start: 2023-02-07 | End: 2023-02-07

## 2023-02-07 RX ORDER — MEPERIDINE HYDROCHLORIDE 25 MG/ML
6.25 INJECTION INTRAMUSCULAR; INTRAVENOUS; SUBCUTANEOUS ONCE AS NEEDED
Status: DISCONTINUED | OUTPATIENT
Start: 2023-02-07 | End: 2023-02-07

## 2023-02-07 RX ORDER — EPINEPHRINE 1 MG/ML
INJECTION, SOLUTION, CONCENTRATE INTRAVENOUS
Status: DISPENSED
Start: 2023-02-07 | End: 2023-02-07

## 2023-02-07 RX ORDER — MAG HYDROX/ALUMINUM HYD/SIMETH 200-200-20
30 SUSPENSION, ORAL (FINAL DOSE FORM) ORAL EVERY 6 HOURS PRN
Status: DISCONTINUED | OUTPATIENT
Start: 2023-02-07 | End: 2023-02-09 | Stop reason: HOSPADM

## 2023-02-07 RX ORDER — METHOCARBAMOL 750 MG/1
750 TABLET, FILM COATED ORAL EVERY 8 HOURS PRN
Status: DISCONTINUED | OUTPATIENT
Start: 2023-02-07 | End: 2023-02-09 | Stop reason: HOSPADM

## 2023-02-07 RX ORDER — GABAPENTIN 300 MG/1
300 CAPSULE ORAL NIGHTLY
Status: DISCONTINUED | OUTPATIENT
Start: 2023-02-07 | End: 2023-02-09 | Stop reason: HOSPADM

## 2023-02-07 RX ORDER — MECLIZINE HCL 12.5 MG 12.5 MG/1
25 TABLET ORAL 3 TIMES DAILY PRN
Status: DISCONTINUED | OUTPATIENT
Start: 2023-02-07 | End: 2023-02-09 | Stop reason: HOSPADM

## 2023-02-07 RX ORDER — BISACODYL 10 MG
10 SUPPOSITORY, RECTAL RECTAL DAILY
Status: DISCONTINUED | OUTPATIENT
Start: 2023-02-10 | End: 2023-02-09 | Stop reason: HOSPADM

## 2023-02-07 RX ORDER — KETOROLAC TROMETHAMINE 10 MG/1
10 TABLET, FILM COATED ORAL ONCE
Status: COMPLETED | OUTPATIENT
Start: 2023-02-07 | End: 2023-02-07

## 2023-02-07 RX ORDER — CEFAZOLIN SODIUM 2 G/100ML
2 INJECTION, SOLUTION INTRAVENOUS
Status: DISCONTINUED | OUTPATIENT
Start: 2023-02-07 | End: 2023-02-07

## 2023-02-07 RX ORDER — FLUTICASONE PROPIONATE 50 MCG
1 SPRAY, SUSPENSION (ML) NASAL ONCE AS NEEDED
Status: DISCONTINUED | OUTPATIENT
Start: 2023-02-07 | End: 2023-02-09 | Stop reason: HOSPADM

## 2023-02-07 RX ORDER — LACTULOSE 10 G/15ML
20 SOLUTION ORAL EVERY 6 HOURS PRN
Status: DISCONTINUED | OUTPATIENT
Start: 2023-02-07 | End: 2023-02-09 | Stop reason: HOSPADM

## 2023-02-07 RX ORDER — ROCURONIUM BROMIDE 10 MG/ML
INJECTION, SOLUTION INTRAVENOUS
Status: DISCONTINUED | OUTPATIENT
Start: 2023-02-07 | End: 2023-02-07

## 2023-02-07 RX ORDER — VENLAFAXINE HYDROCHLORIDE 150 MG/1
150 CAPSULE, EXTENDED RELEASE ORAL DAILY
Status: DISCONTINUED | OUTPATIENT
Start: 2023-02-08 | End: 2023-02-09 | Stop reason: HOSPADM

## 2023-02-07 RX ORDER — ONDANSETRON 2 MG/ML
4 INJECTION INTRAMUSCULAR; INTRAVENOUS DAILY PRN
Status: DISCONTINUED | OUTPATIENT
Start: 2023-02-07 | End: 2023-02-07

## 2023-02-07 RX ORDER — GABAPENTIN 300 MG/1
600 CAPSULE ORAL
Status: COMPLETED | OUTPATIENT
Start: 2023-02-07 | End: 2023-02-07

## 2023-02-07 RX ORDER — ONDANSETRON 2 MG/ML
INJECTION INTRAMUSCULAR; INTRAVENOUS
Status: DISCONTINUED | OUTPATIENT
Start: 2023-02-07 | End: 2023-02-07

## 2023-02-07 RX ORDER — POLYETHYLENE GLYCOL 3350 17 G/17G
17 POWDER, FOR SOLUTION ORAL NIGHTLY
Status: DISCONTINUED | OUTPATIENT
Start: 2023-02-07 | End: 2023-02-09 | Stop reason: HOSPADM

## 2023-02-07 RX ORDER — KETOROLAC TROMETHAMINE 30 MG/ML
INJECTION, SOLUTION INTRAMUSCULAR; INTRAVENOUS
Status: DISCONTINUED | OUTPATIENT
Start: 2023-02-07 | End: 2023-02-07 | Stop reason: HOSPADM

## 2023-02-07 RX ADMIN — NORETHINDRONE AND ETHINYL ESTRADIOL 15 MG: KIT ORAL at 10:02

## 2023-02-07 RX ADMIN — GABAPENTIN 600 MG: 300 CAPSULE ORAL at 07:02

## 2023-02-07 RX ADMIN — SUCRALFATE 1 G: 1 TABLET ORAL at 08:02

## 2023-02-07 RX ADMIN — PHENYLEPHRINE HYDROCHLORIDE 200 MCG: 10 INJECTION INTRAVENOUS at 09:02

## 2023-02-07 RX ADMIN — MIDAZOLAM 2 MG: 1 INJECTION INTRAMUSCULAR; INTRAVENOUS at 09:02

## 2023-02-07 RX ADMIN — SODIUM CHLORIDE, SODIUM GLUCONATE, SODIUM ACETATE, POTASSIUM CHLORIDE AND MAGNESIUM CHLORIDE: 526; 502; 368; 37; 30 INJECTION, SOLUTION INTRAVENOUS at 09:02

## 2023-02-07 RX ADMIN — TRANEXAMIC ACID 1950 MG: 650 TABLET ORAL at 07:02

## 2023-02-07 RX ADMIN — FENTANYL CITRATE 50 MCG: 50 INJECTION, SOLUTION INTRAMUSCULAR; INTRAVENOUS at 09:02

## 2023-02-07 RX ADMIN — SODIUM CHLORIDE: 9 INJECTION, SOLUTION INTRAVENOUS at 12:02

## 2023-02-07 RX ADMIN — ACETAMINOPHEN 1000 MG: 500 TABLET ORAL at 07:02

## 2023-02-07 RX ADMIN — PHENYLEPHRINE HYDROCHLORIDE 35 MCG/MIN: 10 INJECTION INTRAVENOUS at 09:02

## 2023-02-07 RX ADMIN — NORETHINDRONE AND ETHINYL ESTRADIOL 10 MG: KIT ORAL at 09:02

## 2023-02-07 RX ADMIN — NORETHINDRONE AND ETHINYL ESTRADIOL 20 MG: KIT ORAL at 09:02

## 2023-02-07 RX ADMIN — CEFAZOLIN SODIUM 2000 MG: 2 INJECTION, SOLUTION INTRAVENOUS at 03:02

## 2023-02-07 RX ADMIN — NORETHINDRONE AND ETHINYL ESTRADIOL 10 MG: KIT ORAL at 10:02

## 2023-02-07 RX ADMIN — PHENYLEPHRINE HYDROCHLORIDE 100 MCG: 10 INJECTION INTRAVENOUS at 10:02

## 2023-02-07 RX ADMIN — MIDAZOLAM HYDROCHLORIDE 2 MG: 1 INJECTION INTRAMUSCULAR; INTRAVENOUS at 08:02

## 2023-02-07 RX ADMIN — POLYETHYLENE GLYCOL 3350 17 G: 17 POWDER, FOR SOLUTION ORAL at 08:02

## 2023-02-07 RX ADMIN — SUGAMMADEX 150 MG: 100 INJECTION, SOLUTION INTRAVENOUS at 11:02

## 2023-02-07 RX ADMIN — GABAPENTIN 300 MG: 300 CAPSULE ORAL at 08:02

## 2023-02-07 RX ADMIN — PROPOFOL 120 MG: 10 INJECTION, EMULSION INTRAVENOUS at 09:02

## 2023-02-07 RX ADMIN — DEXTROSE 2 G: 50 INJECTION, SOLUTION INTRAVENOUS at 09:02

## 2023-02-07 RX ADMIN — SODIUM CHLORIDE, SODIUM GLUCONATE, SODIUM ACETATE, POTASSIUM CHLORIDE AND MAGNESIUM CHLORIDE: 526; 502; 368; 37; 30 INJECTION, SOLUTION INTRAVENOUS at 10:02

## 2023-02-07 RX ADMIN — SODIUM CHLORIDE: 9 INJECTION, SOLUTION INTRAVENOUS at 08:02

## 2023-02-07 RX ADMIN — ACETAMINOPHEN 1000 MG: 10 INJECTION, SOLUTION INTRAVENOUS at 05:02

## 2023-02-07 RX ADMIN — KETOROLAC TROMETHAMINE 10 MG: 10 TABLET, FILM COATED ORAL at 07:02

## 2023-02-07 RX ADMIN — CEFAZOLIN SODIUM 2000 MG: 2 INJECTION, SOLUTION INTRAVENOUS at 08:02

## 2023-02-07 RX ADMIN — SENNOSIDES AND DOCUSATE SODIUM 2 TABLET: 8.6; 5 TABLET ORAL at 08:02

## 2023-02-07 RX ADMIN — ONDANSETRON 4 MG: 4 TABLET, ORALLY DISINTEGRATING ORAL at 08:02

## 2023-02-07 RX ADMIN — ONDANSETRON HYDROCHLORIDE 4 MG: 2 SOLUTION INTRAMUSCULAR; INTRAVENOUS at 09:02

## 2023-02-07 RX ADMIN — DOXEPIN HYDROCHLORIDE 100 MG: 100 CAPSULE ORAL at 09:02

## 2023-02-07 RX ADMIN — METOCLOPRAMIDE 10 MG: 5 INJECTION, SOLUTION INTRAMUSCULAR; INTRAVENOUS at 02:02

## 2023-02-07 RX ADMIN — ROCURONIUM BROMIDE 10 MG: 10 SOLUTION INTRAVENOUS at 09:02

## 2023-02-07 RX ADMIN — MIDAZOLAM HYDROCHLORIDE 2 MG: 1 INJECTION, SOLUTION INTRAMUSCULAR; INTRAVENOUS at 08:02

## 2023-02-07 RX ADMIN — KETOROLAC TROMETHAMINE 10 MG: 10 TABLET, FILM COATED ORAL at 05:02

## 2023-02-07 RX ADMIN — METOCLOPRAMIDE 10 MG: 5 INJECTION, SOLUTION INTRAMUSCULAR; INTRAVENOUS at 08:02

## 2023-02-07 RX ADMIN — FENTANYL CITRATE 50 MCG: 50 INJECTION, SOLUTION INTRAMUSCULAR; INTRAVENOUS at 10:02

## 2023-02-07 RX ADMIN — DEXAMETHASONE SODIUM PHOSPHATE 4 MG: 4 INJECTION, SOLUTION INTRA-ARTICULAR; INTRALESIONAL; INTRAMUSCULAR; INTRAVENOUS; SOFT TISSUE at 09:02

## 2023-02-07 RX ADMIN — ROCURONIUM BROMIDE 40 MG: 10 SOLUTION INTRAVENOUS at 09:02

## 2023-02-07 RX ADMIN — DOXEPIN HYDROCHLORIDE 25 MG: 25 CAPSULE ORAL at 08:02

## 2023-02-07 NOTE — PROGRESS NOTES
Patient experiencing a delayed response to anesthesia. Will convert to observation status, Will initiate our pre-emptive multimodal pain mgt protocol, provide post-anesthesia monitoring and perform frequent pain assessments. Will rosen for discharge once the patient is tolerating pain and pain medications appropriately and the patient has been cleared from a safety/mobility standpoint.

## 2023-02-07 NOTE — PT/OT/SLP EVAL
"Occupational Therapy   Evaluation    Name: Omaira Brito  MRN: [id number]  Admitting Diagnosis: Rotator cuff arthropathy of right shoulder  Recent Surgery: Procedure(s) (LRB):  ARTHROPLASTY, SHOULDER, TOTAL, REVERSE - FX (Right) Day of Surgery    Recommendations:     Discharge Recommendations: outpatient OT  Discharge Equipment Recommendations:  none  Barriers to discharge:  None    Assessment:     Omaira Brito is a 72 y.o. female with a medical diagnosis of Rotator cuff arthropathy of right shoulder.   Performance deficits affecting function: weakness, impaired endurance, impaired self care skills, impaired functional mobility, decreased upper extremity function, decreased ROM, orthopedic precautions.      Rehab Prognosis: Good; patient would benefit from acute skilled OT services to address these deficits and reach maximum level of function.       Plan:     Patient to be seen daily (BID) to address the above listed problems via self-care/home management, therapeutic activities, therapeutic exercises  Plan of Care Expires: 02/13/23  Plan of Care Reviewed with: patient    Subjective     Chief Complaint: Patient has no complaints   Patient/Family Comments/goals: "ready to get better. I been in pain for so long     Occupational Profile:  Living Environment: Patient lives with s/o, has tub with tub chair.   Previous level of function: Independent   Roles and Routines: Retired nurse   Equipment Used at Home:  (bath chair)  Assistance upon Discharge: Significant other     Pain/Comfort:  Pain Rating 1: 0/10    Prehab: Patient did complete exercises before sx.     Patients cultural, spiritual, Gnosticism conflicts given the current situation: no    Objective:     Communicated with: Nursing prior to session.  Patient found up in chair with blood pressure cuff, oxygen, peripheral IV, pulse ox (continuous), telemetry (shoulder abduction brace) upon OT entry to room.    General Precautions: Standard, " fall  Orthopedic Precautions: RUE non weight bearing  Braces: Shoulder abduction brace  Respiratory Status: Nasal cannula, flow 2 L/min    Occupational Performance:    Bed Mobility:    Patient completed Supine to Sit with stand by assistance    Functional Mobility/Transfers:  Patient completed Sit <> Stand Transfer with contact guard assistance  with  no assistive device   Patient completed Toilet Transfer Step Transfer technique with contact guard assistance with  rolling walker  Functional Mobility: Patient performed functional mobility in hallway with CGA, no AD, ~140 ft. Patient unable to perform any more due to pain and muscle spasms in back.     Activities of Daily Living:  Toileting: contact guard assistance CGA when in standing for pull underwear over hips for safety.    Cognitive/Visual Perceptual:  Cognitive/Psychosocial Skills:     -       Oriented to: Person, Place, Time, and Situation   -       Follows Commands/attention:Follows multistep  commands  -       Communication: clear/fluent  -       Memory: No Deficits noted  -       Safety awareness/insight to disability: intact   -       Mood/Affect/Coping skills/emotional control: Appropriate to situation  Visual/Perceptual:      -Intact      Physical Exam:  Upper Extremity Range of Motion:     -       Left Upper Extremity: WNL  Upper Extremity Strength:    -       Left Upper Extremity: WNL    Treatment & Education:  Eval completed today. Patient educated on surgical precautions, wear and care of shoulder abduction brace, and pain control. Patient educated on wearing brace at all times, only to be removed when exercising and showering. Patient educated on staying on top of pain with provided meds and ice to reduce pain and inflammation. Patient educated on sleeping in reclined position with pillow behind shoulder for comfort and to decrease risk of shoulder extension. Patient educated on HEP however unable to complete exercises due to nerve block. Patient  performed functional mobility in hallway with CGA, no AD, ~140 ft. Patient performed toilet transfer with CGA for safety. Patient able to complete toileting and only required CGA for steadying when standing to pull up underwear.     Will complete UB dressing and HEP tomorrow.     Patient left up in chair with all lines intact, call button in reach, and nurse notified    GOALS:   Multidisciplinary Problems       Occupational Therapy Goals          Problem: Occupational Therapy    Goal Priority Disciplines Outcome Interventions   Occupational Therapy Goal     OT, PT/OT Ongoing, Progressing    Description: Pt will perform UB dress c independence using hemitechniques by d/c.   Pt will perform toilet t/f using LRAD c independence by d/c.  Pt will perform toileting c independence by d/c.   Pt will perform tub t/f c mod I using tub chair by d/c.   Pt will be Ind c HEP and pxns Compliance by d/c.                         History:     Past Medical History:   Diagnosis Date    HLD (hyperlipidemia)     Osteoarthritis     Thyroid disease          Past Surgical History:   Procedure Laterality Date    ANTERIOR CERVICAL DISCECTOMY W/ FUSION      APPENDECTOMY      BACK SURGERY      fusion    BUNIONECTOMY Bilateral     BUNIONECTOMY Left     with foot reconstruction    BUNIONECTOMY Right     CARPAL TUNNEL RELEASE Bilateral     CATARACT EXTRACTION W/  INTRAOCULAR LENS IMPLANT Bilateral     DILATION AND CURETTAGE OF UTERUS      HYSTERECTOMY      JOINT REPLACEMENT Left     total knee    LAPAROSCOPIC REPAIR OF HIATAL HERNIA      REDUCTION OF BOTH BREASTS      with abdominoplasty    SHOULDER ARTHROSCOPY W/ ROTATOR CUFF REPAIR Bilateral     TONSILLECTOMY      adenoidectomy    TOTAL KNEE ARTHROPLASTY Right        Time Tracking:     OT Date of Treatment: 02/07/23  OT Start Time: 1505  OT Stop Time: 1550  OT Total Time (min): 45 min    Billable Minutes:Evaluation 30  Self Care/Home Management 15    2/7/2023

## 2023-02-07 NOTE — OP NOTE
DATE OF SERVICE: 02/07/2023    SURGEON: Ron Apple MD    PREOPERATIVE DIAGNOSIS: Right shoulder rotator cuff arthropathy    POSTOPERATIVE DIAGNOSIS: Right shoulder rotator cuff arthropathy    PROCEDURE PERFORMED: Right Reverse total shoulder arthroplasty    ASSISTANT: Marta Medina NP. Ms. Medina was essential in manipulating the shoulder while I performed the arthroplasty, retraction, and closure.    ANESTHESIA: General plus regional    ESTIMATED BLOOD LOSS: 100cc    COMPLICATIONS: None.    IMPLANTS:    1.  Stem: FX 9 mm stem   2.  Glenoid: FX 24 mm baseplate, 15 mm posterior/superior augment, 32 mm, 3 mm lateralized glenosphere with eccentric offset   3.  Bearing surface: +3 Tray and bearing    INDICATIONS FOR PROCEDURE: Omaira Brito is a 72 y.o. year old who has had ongoing right shoulder pain and weakness. The patient was seen in the clinic and preoperative imaging has revealed rotator cuff arthropathy. The patient has failed nonoperative treatment and has elected for operative intervention. Risks and benefits were thoroughly explained and consent was obtained prior to today's procedure.    DESCRIPTION OF PROCEDURE: The patient was seen in the preoperative holding area where the history and physical were reviewed without change. The operative shoulder was marked, consents were reviewed, and any questions were answered for the patient. A regional blockade of the shoulder was performed by the Anesthesia Service. The patient was induced under general anesthesia. Next the patient was placed upright into the beachchair position with care taken to ensure the cervical spine was well positioned and the face, eyes and all bony prominences well protected. Preoperative time-out led by the surgeon was performed verifying the patient, procedure, and preoperative antibiotics. The right upper extremity was then prepped and draped in the usual sterile fashion and secured to an arm roach.    I began with a standard  deltopectoral approach.  I sharply incised the skin and subcutaneous tissue.  Identified the cephalic vein, protected it, and retracted it laterally.  I came down to the proximal humerus and identified the biceps tendon as it crossed underneath the pectoralis.  I released the upper portion of the pectoralis to gain access to the tendon.  I then released the biceps tendon and traced superiorly through the rotator interval and into the glenohumeral joint.  I performed a tenotomy at the insertion of the glenohumeral joint.  I then used a cautery device in order to peel the subscapularis off of the anterior aspect of the lesser tuberosity.  I was then able to dislocate the glenohumeral joint anteriorly.  The humeral head was arthritic.  I then used a sagittal saw in order to perform a free hand cut of the articular surface.  I trimmed surrounding osteophytes.  I then used a canal finder to find the medullary canal of the proximal humerus.  I sequentially reamed and broached until I found a secure fit.  I then placed a protector over the cut surface and retracted the humerus posteriorly.    Using an assortment of retractors, I was able to expose the glenoid.  I used a needle tip Bovie in order to release the inflamed and hypertrophic labrum anteriorly inferiorly and superiorly around the glenoid.  There was minimal labrum posteriorly.  Using a guided system placed a guidewire center center into the glenoid with a 10 degree tilt.  I then step reamed to denude the glenoid of cartilage and prepare a fresh bony bed for the baseplate.  She had a large drop off posterior and superior which was able to make up with the augmented base plate.  I impacted the baseplate in place.  I drilled and initially secured the baseplate with a cortical screw.  I further secure the baseplate using an assortment of locking screws.  Happy with the instability, I clean the baseplate of all debris and dried it appropriately.  On the back table, I  placed appropriate offset to the glenosphere.  I then impacted the glenoid sphere onto the baseplate.  I was happy with stability and turned my attention back to the humerus.    I then began to trial my tray and bearing surface onto the stem trial.  I reduce the shoulder and increase to my tray and bearing trial sizes as needed in order to obtain appropriate stability.  Happy with both stability and motion, I impacted my final stem, tray, and bearing surface into place.  I again took the shoulder through range of motion and was happy with both stability and motion.  I then copiously irrigated the wound with pulse irrigation.  I used a chlorhexidine bath to irrigate the prosthesis.  I injected local anesthesia/antibiotic concoction around the deep capsule and subcutaneous tissue.  I again irrigated with pulse irrigation.  I then closed the deltopectoral interval with a permanent #2 suture.  I closed the subcutaneous layer with a 2-0 Monocryl suture.  I closed the skin with #3-0 monocryl suture. Sterile dressings were applied. The patient was then placed in an abduction pillow and sling, awoken from general anesthetic, and taken to recovery room in a stable condition.

## 2023-02-07 NOTE — ANESTHESIA PROCEDURE NOTES
Intubation    Date/Time: 2/7/2023 9:17 AM  Performed by: Addison Petersen CRNA  Authorized by: Karli Arevalo MD     Intubation:     Induction:  Intravenous    Intubated:  Postinduction    Mask Ventilation:  Easy with oral airway    Attempts:  1    Attempted By:  CRNA    Method of Intubation:  Direct    Blade:  Montiel 2    Laryngeal View Grade: Grade I - full view of cords      Difficult Airway Encountered?: No      Complications:  None    Airway Device:  Oral endotracheal tube    Airway Device Size:  7.5    Style/Cuff Inflation:  Cuffed    Inflation Amount (mL):  30    Tube secured:  21    Secured at:  The lips    Placement Verified By:  Capnometry    Complicating Factors:  None    Findings Post-Intubation:  BS equal bilateral and atraumatic/condition of teeth unchanged

## 2023-02-07 NOTE — ANESTHESIA POSTPROCEDURE EVALUATION
Anesthesia Post Evaluation    Patient: Omaira Brito    Procedure(s) Performed: Procedure(s) (LRB):  ARTHROPLASTY, SHOULDER, TOTAL, REVERSE - FX (Right)    Final Anesthesia Type: general      Patient location during evaluation: PACU  Patient participation: Yes- Able to Participate  Level of consciousness: awake and alert  Post-procedure vital signs: reviewed and stable  Pain management: adequate  Airway patency: patent    PONV status at discharge: No PONV  Anesthetic complications: no      Cardiovascular status: hemodynamically stable  Respiratory status: unassisted  Hydration status: euvolemic  Follow-up not needed.          Vitals Value Taken Time   /63 02/07/23 1200   Temp 36.5 °C (97.7 °F) 02/07/23 1125   Pulse 87 02/07/23 1202   Resp 19 02/07/23 1202   SpO2 92 % 02/07/23 1202   Vitals shown include unvalidated device data.      Event Time   Out of Recovery 12:03:08         Pain/Ashley Score: Pain Rating Prior to Med Admin: 1 (2/7/2023  7:35 AM)  Ashley Score: 9 (2/7/2023 11:46 AM)

## 2023-02-07 NOTE — ANESTHESIA PREPROCEDURE EVALUATION
02/07/2023  Omaira Brito is a 72 y.o., female with ----------------------------  HLD (hyperlipidemia)  Osteoarthritis  Thyroid disease    And ----------------------------  Anterior cervical discectomy w/ fusion  Appendectomy  Back surgery      Comment:  fusion  Bunionectomy  Bunionectomy      Comment:  with foot reconstruction  Bunionectomy  Carpal tunnel release  Cataract extraction w/  intraocular lens implant  Dilation and curettage of uterus  Hysterectomy  Joint replacement      Comment:  total knee  Laparoscopic repair of hiatal hernia  Reduction of both breasts      Comment:  with abdominoplasty  Shoulder arthroscopy w/ rotator cuff repair  Tonsillectomy      Comment:  adenoidectomy  Total knee arthroplasty    Presents for right reverse total shoulder arthroplsty.  Pt has had shoulder surgery in the past no anesthetic issues - had more recent ACDF about 4-5years ago with what sounds like a postop hematoma w/evacuation and ICU stay - no issues with her neck currently      Pre-op Assessment    I have reviewed the NPO Status.      Review of Systems      Physical Exam  General: Well nourished, Cooperative, Alert and Oriented  Jovial, and spry  Airway:  Mallampati: II   Mouth Opening: Normal  TM Distance: Normal  Tongue: Normal  Neck ROM: Extension Decreased    Dental:  Edentulous  Only remaining teeth  Chest/Lungs:  Clear to auscultation, Normal Respiratory Rate    Heart:  Rate: Normal  Rhythm: Regular Rhythm        Anesthesia Plan  Type of Anesthesia, risks & benefits discussed:    Anesthesia Type: Regional, Gen ETT  Intra-op Monitoring Plan: Standard ASA Monitors  Post Op Pain Control Plan: multimodal analgesia, peripheral nerve block and IV/PO Opioids PRN  Induction:  IV  Informed Consent: Informed consent signed with the Patient and all parties understand the risks and agree with anesthesia plan.   All questions answered.   ASA Score: 3  Day of Surgery Review of History & Physical: H&P Update referred to the surgeon/provider.  Anesthesia Plan Notes: Pre-Meds with midazolam  ERAS per protocol  Bupiv 0.25% with Exparel interscalene block for postop pain control as requested by Dr. Apple  PACU post op     Ready For Surgery From Anesthesia Perspective.     .

## 2023-02-07 NOTE — TRANSFER OF CARE
"Anesthesia Transfer of Care Note    Patient: Omaira Brito    Procedure(s) Performed: Procedure(s) (LRB):  ARTHROPLASTY, SHOULDER, TOTAL, REVERSE - FX (Right)    Patient location: PACU    Anesthesia Type: general    Transport from OR: Transported from OR on room air with adequate spontaneous ventilation    Post pain: adequate analgesia    Post assessment: no apparent anesthetic complications    Post vital signs: stable    Level of consciousness: awake and responds to stimulation    Nausea/Vomiting: no nausea/vomiting    Complications: none    Transfer of care protocol was followed      Last vitals:   Visit Vitals  /68   Pulse 73   Temp 36.4 °C (97.5 °F)   Resp 20   Ht 4' 11" (1.499 m)   Wt 73 kg (160 lb 15 oz)   SpO2 100%   Breastfeeding No   BMI 32.51 kg/m²     "

## 2023-02-07 NOTE — PLAN OF CARE
S/p RTSR. Spk w pt -- s/o , Di to asst w homecare. Sling maintained. Per MD, plan to start outpt therapy after office visit. Pt notified- verb under. No additional dcp needs identified.     PCP: Geronimo

## 2023-02-07 NOTE — OR NURSING
08:31  TIMEOUT DONE    08:36  DR. ROD WILL ATTEMPT TO DO A RIGHT SUPRACLAVICULAR NERVE BLOCK.    08:37  BLOCK COMPLETED AND TOLERATED

## 2023-02-07 NOTE — PLAN OF CARE
Problem: Occupational Therapy  Goal: Occupational Therapy Goal  Description: Pt will perform UB dress c independence using hemitechniques by d/c.   Pt will perform toilet t/f using LRAD c independence by d/c.  Pt will perform toileting c independence by d/c.   Pt will perform tub t/f c mod I using tub chair by d/c.   Pt will be Ind c HEP and pxns Compliance by d/c.    Outcome: Ongoing, Progressing

## 2023-02-08 LAB
ANION GAP SERPL CALC-SCNC: 6 MEQ/L
BASOPHILS # BLD AUTO: 0.02 X10(3)/MCL (ref 0–0.2)
BASOPHILS NFR BLD AUTO: 0.3 %
BUN SERPL-MCNC: 7.6 MG/DL (ref 9.8–20.1)
CALCIUM SERPL-MCNC: 8.2 MG/DL (ref 8.4–10.2)
CHLORIDE SERPL-SCNC: 101 MMOL/L (ref 98–107)
CO2 SERPL-SCNC: 32 MMOL/L (ref 23–31)
CREAT SERPL-MCNC: 0.68 MG/DL (ref 0.55–1.02)
CREAT/UREA NIT SERPL: 11
EOSINOPHIL # BLD AUTO: 0.02 X10(3)/MCL (ref 0–0.9)
EOSINOPHIL NFR BLD AUTO: 0.3 %
ERYTHROCYTE [DISTWIDTH] IN BLOOD BY AUTOMATED COUNT: 13.3 % (ref 11.5–17)
GFR SERPLBLD CREATININE-BSD FMLA CKD-EPI: >60 MLS/MIN/1.73/M2
GLUCOSE SERPL-MCNC: 110 MG/DL (ref 82–115)
HCT VFR BLD AUTO: 34.6 % (ref 37–47)
HGB BLD-MCNC: 11 GM/DL (ref 12–16)
IMM GRANULOCYTES # BLD AUTO: 0.03 X10(3)/MCL (ref 0–0.04)
IMM GRANULOCYTES NFR BLD AUTO: 0.4 %
LYMPHOCYTES # BLD AUTO: 0.88 X10(3)/MCL (ref 0.6–4.6)
LYMPHOCYTES NFR BLD AUTO: 11.8 %
MCH RBC QN AUTO: 30.8 PG
MCHC RBC AUTO-ENTMCNC: 31.8 MG/DL (ref 33–36)
MCV RBC AUTO: 96.9 FL (ref 80–94)
MONOCYTES # BLD AUTO: 0.74 X10(3)/MCL (ref 0.1–1.3)
MONOCYTES NFR BLD AUTO: 9.9 %
NEUTROPHILS # BLD AUTO: 5.79 X10(3)/MCL (ref 2.1–9.2)
NEUTROPHILS NFR BLD AUTO: 77.3 %
NRBC BLD AUTO-RTO: 0 %
PLATELET # BLD AUTO: 182 X10(3)/MCL (ref 130–400)
PMV BLD AUTO: 10 FL (ref 7.4–10.4)
POTASSIUM SERPL-SCNC: 3.5 MMOL/L (ref 3.5–5.1)
RBC # BLD AUTO: 3.57 X10(6)/MCL (ref 4.2–5.4)
SODIUM SERPL-SCNC: 139 MMOL/L (ref 136–145)
WBC # SPEC AUTO: 7.5 X10(3)/MCL (ref 4.5–11.5)

## 2023-02-08 PROCEDURE — G0378 HOSPITAL OBSERVATION PER HR: HCPCS

## 2023-02-08 PROCEDURE — 97535 SELF CARE MNGMENT TRAINING: CPT

## 2023-02-08 PROCEDURE — 27000221 HC OXYGEN, UP TO 24 HOURS

## 2023-02-08 PROCEDURE — 97110 THERAPEUTIC EXERCISES: CPT

## 2023-02-08 PROCEDURE — 80048 BASIC METABOLIC PNL TOTAL CA: CPT | Performed by: ORTHOPAEDIC SURGERY

## 2023-02-08 PROCEDURE — 94799 UNLISTED PULMONARY SVC/PX: CPT

## 2023-02-08 PROCEDURE — 94761 N-INVAS EAR/PLS OXIMETRY MLT: CPT

## 2023-02-08 PROCEDURE — 63600175 PHARM REV CODE 636 W HCPCS: Mod: TB,JG | Performed by: ORTHOPAEDIC SURGERY

## 2023-02-08 PROCEDURE — 25000003 PHARM REV CODE 250: Performed by: NURSE PRACTITIONER

## 2023-02-08 PROCEDURE — 25000003 PHARM REV CODE 250: Performed by: ORTHOPAEDIC SURGERY

## 2023-02-08 PROCEDURE — 85025 COMPLETE CBC W/AUTO DIFF WBC: CPT | Performed by: ORTHOPAEDIC SURGERY

## 2023-02-08 RX ORDER — POLYETHYLENE GLYCOL 3350 17 G/17G
17 POWDER, FOR SOLUTION ORAL DAILY
Qty: 14 EACH
Start: 2023-02-08 | End: 2023-02-22

## 2023-02-08 RX ORDER — ASPIRIN 81 MG/1
81 TABLET ORAL EVERY 12 HOURS
Qty: 60 TABLET | Refills: 0
Start: 2023-02-08 | End: 2023-08-02

## 2023-02-08 RX ORDER — GUAIFENESIN 600 MG/1
600 TABLET, EXTENDED RELEASE ORAL 2 TIMES DAILY
Status: DISCONTINUED | OUTPATIENT
Start: 2023-02-08 | End: 2023-02-09 | Stop reason: HOSPADM

## 2023-02-08 RX ORDER — OXYCODONE AND ACETAMINOPHEN 5; 325 MG/1; MG/1
1 TABLET ORAL EVERY 4 HOURS PRN
Qty: 42 TABLET | Refills: 0 | Status: SHIPPED | OUTPATIENT
Start: 2023-02-08 | End: 2023-02-15

## 2023-02-08 RX ORDER — METHOCARBAMOL 750 MG/1
750 TABLET, FILM COATED ORAL EVERY 6 HOURS
Qty: 56 TABLET | Refills: 0 | Status: SHIPPED | OUTPATIENT
Start: 2023-02-08 | End: 2023-02-22

## 2023-02-08 RX ADMIN — BUSPIRONE HYDROCHLORIDE 15 MG: 10 TABLET ORAL at 08:02

## 2023-02-08 RX ADMIN — SUCRALFATE 1 G: 1 TABLET ORAL at 09:02

## 2023-02-08 RX ADMIN — SENNOSIDES AND DOCUSATE SODIUM 2 TABLET: 8.6; 5 TABLET ORAL at 09:02

## 2023-02-08 RX ADMIN — DOCUSATE SODIUM 200 MG: 100 CAPSULE, LIQUID FILLED ORAL at 05:02

## 2023-02-08 RX ADMIN — KETOROLAC TROMETHAMINE 10 MG: 10 TABLET, FILM COATED ORAL at 05:02

## 2023-02-08 RX ADMIN — METOCLOPRAMIDE 10 MG: 5 INJECTION, SOLUTION INTRAMUSCULAR; INTRAVENOUS at 08:02

## 2023-02-08 RX ADMIN — GUAIFENESIN 600 MG: 600 TABLET ORAL at 09:02

## 2023-02-08 RX ADMIN — GABAPENTIN 300 MG: 300 CAPSULE ORAL at 09:02

## 2023-02-08 RX ADMIN — VENLAFAXINE HYDROCHLORIDE 150 MG: 150 CAPSULE, EXTENDED RELEASE ORAL at 08:02

## 2023-02-08 RX ADMIN — KETOROLAC TROMETHAMINE 10 MG: 10 TABLET, FILM COATED ORAL at 11:02

## 2023-02-08 RX ADMIN — LEVOTHYROXINE SODIUM 75 MCG: 0.05 TABLET ORAL at 05:02

## 2023-02-08 RX ADMIN — ATORVASTATIN CALCIUM 20 MG: 10 TABLET, FILM COATED ORAL at 08:02

## 2023-02-08 RX ADMIN — METOCLOPRAMIDE 10 MG: 5 INJECTION, SOLUTION INTRAMUSCULAR; INTRAVENOUS at 02:02

## 2023-02-08 RX ADMIN — DOXEPIN HYDROCHLORIDE 25 MG: 25 CAPSULE ORAL at 09:02

## 2023-02-08 RX ADMIN — CEFAZOLIN SODIUM 2000 MG: 2 INJECTION, SOLUTION INTRAVENOUS at 02:02

## 2023-02-08 RX ADMIN — CETIRIZINE HYDROCHLORIDE 10 MG: 10 TABLET, FILM COATED ORAL at 08:02

## 2023-02-08 RX ADMIN — DOXEPIN HYDROCHLORIDE 100 MG: 100 CAPSULE ORAL at 09:02

## 2023-02-08 RX ADMIN — SUCRALFATE 1 G: 1 TABLET ORAL at 08:02

## 2023-02-08 RX ADMIN — METOCLOPRAMIDE 10 MG: 5 INJECTION, SOLUTION INTRAMUSCULAR; INTRAVENOUS at 09:02

## 2023-02-08 RX ADMIN — SENNOSIDES AND DOCUSATE SODIUM 2 TABLET: 8.6; 5 TABLET ORAL at 08:02

## 2023-02-08 RX ADMIN — GUAIFENESIN 600 MG: 600 TABLET ORAL at 11:02

## 2023-02-08 NOTE — DISCHARGE INSTRUCTIONS
[facility]  [address]  [place] [address]  Phone [phone]       /      Fax [phone]  SURGEON: Dr. Apple    After discharge, all questions or concerns should be handled at your surgeon's office ([phone]). If it is a weekend or after hours, you will get the surgeon on call.     Discharge Medications:    PAIN MANAGEMENT: Next Dose Available   Robaxin/Methocarbamol 750mg (Muscle Relaxer) - Every 6-8 hours AS NEEDED for muscle spasms, thigh pain or additional pain control Anytime if needed   Percocet 5/325mg (Oxycodone/Acetaminophen) (Pain Med) - 1/2 to 1 whole tablet every 4-6 hours AS NEEDED for pain Anytime if needed   COMPLICATION PREVENTION MEDS: Next Dose DUE   Aspirin 81mg twice a day for 4 weeks post-op for blood clot prevention PM on 2/9/23   MiraLAX 17gm - once or twice a day while on narcotics and muscle relaxers for constipation prevention PM on 2/9/23         SHOULDER REPLACEMENT    PAIN CONTROL & PAIN MEDICATION:  Pain medicine can cause nausea and vomiting, especially on an empty stomach. Ice and elevation are more useful than pain medicine for surgical pain.  If you are having too much pain or discomfort, try more ice and higher elevation.  Please Do NOT drive a vehicle or use heavy machinery while taking pain medication. Do NOT drink alcohol while taking pain medication  Decrease the use of narcotics as the pain lessens.     Use the Percocet 5/325mg (Oxycodone 5/Acetaminophen 325mg) (pain pill) as prescribed for pain after surgery.     **NO MORE THAN 3000mg OF TYLENOL IN 24 HOURS**.    Robaxin/Methocarbamol 750mg (muscle relaxer) - Can be used every 6-8 hours as needed for muscle spasms, additional pain control or breakthrough pain medications. Please reduce the use gradually as the pain and spasms lessen. Do NOT take muscle relaxers and pain medication together unless you are in a bind (SEVERE PAIN). Ok to save a dose for the bedtime hour to help  with sleep.      WOUND CARE DRESSING:  Keep the dressing clean and dry.  Start every other day wound care on SATURDAY. No ointments, creams, lotions or antiseptics on the incision. Do not remove the glue covering your wound.     SHOWERING:  You may shower 5 days after surgery as long as you do NOT wet the wound/dressing. It is not uncommon to have drainage a few days after surgery.  Keep the wound covered until you follow-up with your surgeon.     DIET:  Have a regular, well balanced diet, as tolerated. Add some protein and iron to your diet for wound healing purposes. Take a multivitamin with iron for 1 month post-op.      ACTIVITY:  Sleep as upright as possible using extra pillows as needed.  A recliner may also be helpful to sleep upright.  Do this for the first few days after surgery to help decrease pain and swelling.    Apply Ice packs as much as possible.   Ice packs should be applied to the shoulder for 30 minutes, 5-6 times per day, for the 1st week to help decrease pain and swelling.  After the first week, apply as needed, especially after exercises and physical therapy.    Wear the sling at all times including while sleeping.  The only time you may remove the sling is for showers and to perform home exercise regimen or therapy exercises.    PHYSICAL THERAPY:   Continue Pendulum range of motion exercises and movement of the elbow, wrist and fingers as instructed by occupational therapy.   All exercises and activities must be within the protocol until rehab is complete. Some patients will not start outpatient physical therapy until after their first follow-up appointment.    BLOOD CLOT PREVENTION:  If you are aware that you are at high risk for blood clots, notify your physician.  In general, you should walk as much as possible, at least every 1-2 hours, after surgery to increase blood circulation throughout your body. Most patients will take Aspirin 81 mg twice a day, morning and night for the next 4  weeks. Start on 2/9/23. Stop on 3/9/23.     You need to continuing wearing your compression stocking (YAIR Hose - ThromboEmbolic Disease Prevention Device) for the next 2 weeks. It is ok to remove them for hygiene and at bedtime. Walking is the best thing you can do to prevent blood clots.     CONSTIPATION PREVENTION:   Use Miralax or Senokot S/Brianna-Colace and Stool softeners every day while on pain meds. If you do not begin having regular bowel movements once you get home, start taking more aggressive over the counter LAXATIVES as needed for constipation. Some examples of over the counter laxatives would be: Milk of Magnesia, Dulcolax Suppository/Tablets, Magnesium citrate, Fleet's enema...etc).   Other things that help with preventing constipation is to drink lots of water, increase fiber in your diet and increase your walking distance each day.      URINARY RETENTION:  If you start having difficulty with urinating, discontinue Pain pills and muscle relaxers as much as possible and call your primary care doctor.       PNEUMONIA PREVENTION:  To prevent pneumonia, you should stay out of bed as much as possible. Continue breathing exercises (Incentive spirometry) every 1-2 hours while you are on pain medication and not as mobile.       INFECTION PREVENTION:  No smoking or Tobacco products  No pets in the bed or around the wound/dressing   Pre-medicate before dental/surgical procedures and address UTIs or other infectious processes in the body as soon as possible.   Use gloves and use good hand washing before and after dressing changes. Please notify YOUR SURGEON of excessive wound drainage.   Please avoid wetting the wound, if possible.   If you are diabetic, continue monitoring blood sugars. If you continue to see high numbers, call your primary care doctor. Maintain tight blood sugar control (Below 150's) as you go through your recovery.     DRIVING OR FLYING:  In general, you should NOT drive while wearing a  sling.  You must NEVER drive while taking narcotic pain medication.        WORK OR SCHOOL:  You may return to an office job or school whenever comfortable.  Most patients return 1 week after surgery.  For more active jobs that require extended walking, squatting, or lifting, you can wait until after your follow- up appointment.  Any other types of jobs should be discussed with Dr. Apple to determine a date for return to work.    PROBLEMS TO REPORT:  Unusual redness, swelling, excessive,  cloudy or foul smelling drainage at the incision site.   Persistent low grade temp or a temp greater than 102 F, unrelieved by Tylenol  Pain at surgical site, unrelieved by pain meds      Warning signs of a blood clot in your leg: (CALL YOUR DOCTOR)  New Onset or Increasing pain in calf, tenderness or redness above or below the knee or increasing swelling of your calf, ankle, or foot.  Warning signs that a blood clot has traveled to your lungs: (REPORT TO THE ER)  Sudden or increase in Shortness of breath, sudden onset of chest pains, or  Localized chest pain with coughing.     RETURN APPOINTMENT:  To confirm or reschedule your appointment, call [phone].    IF YOU NEED TO CALL YOUR PRIMARY CARE DOCTOR FOR ANY ISSUES DURING YOUR RECOVERY, PLEASE NOTIFY YOUR ORTHOPEDIC SURGEON AS WELL.   For emergencies, please report to our ([facility] or [facility] main campus) Emergency department and tell them to call Dr. Apple at [phone].

## 2023-02-08 NOTE — PLAN OF CARE
Problem: Adult Inpatient Plan of Care  Goal: Plan of Care Review  Outcome: Ongoing, Progressing  Goal: Patient-Specific Goal (Individualized)  Outcome: Ongoing, Progressing  Goal: Absence of Hospital-Acquired Illness or Injury  Outcome: Ongoing, Progressing  Goal: Optimal Comfort and Wellbeing  Outcome: Ongoing, Progressing  Goal: Readiness for Transition of Care  Outcome: Ongoing, Progressing     Problem: Infection  Goal: Absence of Infection Signs and Symptoms  Outcome: Ongoing, Progressing     Problem: Adjustment to Surgery (Shoulder Arthroplasty)  Goal: Optimal Coping  Outcome: Ongoing, Progressing     Problem: Bleeding (Shoulder Arthroplasty)  Goal: Absence of Bleeding  Outcome: Ongoing, Progressing     Problem: Bowel Motility Impaired (Shoulder Arthroplasty)  Goal: Effective Bowel Elimination  Outcome: Ongoing, Progressing     Problem: Fluid and Electrolyte Imbalance (Shoulder Arthroplasty)  Goal: Fluid and Electrolyte Balance  Outcome: Ongoing, Progressing     Problem: Functional Ability Impaired (Shoulder Arthroplasty)  Goal: Optimal Functional Ability  Outcome: Ongoing, Progressing     Problem: Infection (Shoulder Arthroplasty)  Goal: Absence of Infection Signs and Symptoms  Outcome: Ongoing, Progressing     Problem: Neurovascular Compromise (Shoulder Arthroplasty)  Goal: Intact Neurovascular Status  Outcome: Ongoing, Progressing     Problem: Ongoing Anesthesia Effects (Shoulder Arthroplasty)  Goal: Anesthesia/Sedation Recovery  Outcome: Ongoing, Progressing     Problem: Pain (Shoulder Arthroplasty)  Goal: Acceptable Pain Control  Outcome: Ongoing, Progressing     Problem: Postoperative Nausea and Vomiting (Shoulder Arthroplasty)  Goal: Nausea and Vomiting Relief  Outcome: Ongoing, Progressing     Problem: Postoperative Urinary Retention (Shoulder Arthroplasty)  Goal: Effective Urinary Elimination  Outcome: Ongoing, Progressing     Problem: Fall Injury Risk  Goal: Absence of Fall and Fall-Related  Injury  Outcome: Ongoing, Progressing     Problem: Pain Acute  Goal: Acceptable Pain Control and Functional Ability  Outcome: Ongoing, Progressing

## 2023-02-08 NOTE — PT/OT/SLP PROGRESS
Occupational Therapy   Treatment    Name: Omaira Brito  MRN: [id number]  Admitting Diagnosis:  Rotator cuff arthropathy of right shoulder  1 Day Post-Op    Recommendations:     Discharge Recommendations: outpatient OT  Discharge Equipment Recommendations:  none  Barriers to discharge:  None    Assessment:     Omaira Brito is a 72 y.o. female with a medical diagnosis of Rotator cuff arthropathy of right shoulder.   Performance deficits affecting function are weakness, impaired endurance, impaired self care skills, impaired functional mobility, decreased upper extremity function, decreased ROM, orthopedic precautions.     Rehab Prognosis:  Good; patient would benefit from acute skilled OT services to address these deficits and reach maximum level of function.       Plan:     Patient to be seen daily (BID) to address the above listed problems via self-care/home management, therapeutic activities, therapeutic exercises  Plan of Care Expires: 02/13/23  Plan of Care Reviewed with: patient, spouse    Subjective     Pain/Comfort:  Pain Rating 1: 3/10  Location - Side 1: Right  Location 1: shoulder    Objective:     Communicated with: Nursing prior to session.  Patient found sitting edge of bed with peripheral IV upon OT entry to room.    General Precautions: Standard, fall    Orthopedic Precautions:RUE non weight bearing  Braces: Shoulder abduction brace  Respiratory Status: Nasal cannula, flow 3 L/min     Occupational Performance:    Functional Mobility/Transfers:  Patient completed Sit <> Stand Transfer with independence  with  no assistive device     Treatment & Education:  Patient completed HEP. Patient completed distal AROM and pendulums-finger flex/ext, wrist flex/ext, radial/ulnar deviation, forearm sup/pro, and elbow flex/ext. Patient tolerated exercises well with minimal pain noted. Patient completed standing pendulums, 3 positions.   Spouse was present for education on shoulder abduction brace. Patient  and spouse educated on donning/doffing shoulder abduction brace. Spouse educated on wear and care of brace. Patient and spouse verbalized understanding of all information provided.     Patient left sitting edge of bed with all lines intact, call button in reach, and spouse and friend present    GOALS:   Multidisciplinary Problems       Occupational Therapy Goals          Problem: Occupational Therapy    Goal Priority Disciplines Outcome Interventions   Occupational Therapy Goal     OT, PT/OT Ongoing, Progressing    Description: Pt will perform UB dress c independence using hemitechniques by d/c.   Pt will perform toilet t/f using LRAD c independence by d/c.  Pt will perform toileting c independence by d/c.   Pt will perform tub t/f c mod I using tub chair by d/c.   Pt will be Ind c HEP and pxns Compliance by d/c.                         Time Tracking:     OT Date of Treatment: 02/08/23  OT Start Time: 1216  OT Stop Time: 1239  OT Total Time (min): 23 min    Billable Minutes:Self Care/Home Management 10  Therapeutic Exercise 13    OT/JON: OT          2/8/2023

## 2023-02-08 NOTE — PT/OT/SLP PROGRESS
Occupational Therapy   Treatment    Name: Omaira Brito  MRN: [id number]  Admitting Diagnosis:  Rotator cuff arthropathy of right shoulder  1 Day Post-Op    Recommendations:     Discharge Recommendations: outpatient OT  Discharge Equipment Recommendations:  none  Barriers to discharge:  None    Assessment:     Omaira Brito is a 72 y.o. female with a medical diagnosis of Rotator cuff arthropathy of right shoulder.   Performance deficits affecting function are weakness, impaired endurance, impaired self care skills, impaired functional mobility, decreased upper extremity function, pain, decreased ROM, orthopedic precautions.     Rehab Prognosis:  Good; patient would benefit from acute skilled OT services to address these deficits and reach maximum level of function.       Plan:     Patient to be seen daily (BID) to address the above listed problems via self-care/home management, therapeutic activities, therapeutic exercises  Plan of Care Expires: 02/13/23  Plan of Care Reviewed with: patient    Subjective     Pain/Comfort:  Pain Rating 1: 0/10    Objective:     Communicated with: Nursing prior to session.  Patient found up in chair with peripheral IV upon OT entry to room.    General Precautions: Standard, fall    Orthopedic Precautions:RUE non weight bearing  Braces: Shoulder abduction brace  Respiratory Status: Nasal cannula, flow 2 L/min     Occupational Performance:     Functional Mobility/Transfers:  Patient completed Sit <> Stand Transfer with supervision  with  no assistive device   Patient completed Toilet Transfer Step Transfer technique with supervision with  no AD  Patient completed tub transfer with mod I using tub chair.   Functional Mobility: Patient performed FM in hallway with SPV, ~300 ft.    Activities of Daily Living:  Upper Body Dressing: independence Patient used anya techniques for UBD.   Lower Body Dressing: supervision SPV when standing to pull underwear and pants over hips.    Toileting: supervision SPV for safety     Treatment & Education:  Patient educated on anya techniques to complete UBD. Patient educated on importance of threading shirt over sx arm to prevent active shoulder movement. Patient educated on donning/doffing shoulder abduction brace. Patient educated on HEP. Patient completed AROM to distal joints-finger flex/ext, wrist flex/ext, radial/ulnar deviation, forearm supination/pronation, and elbow flex ext with elbow by side. Patient completed standing pendulums in three directions. Patient required two verbal cues to move with hips rather than actively moving arm. Patient demo'd understanding of all information provided.     Will need one session in PM to complete HEP and educate partner on shoulder abduction brace in prep for D/C home.     Patient left up in chair with all lines intact, call button in reach, and nurseCa notified    GOALS:   Multidisciplinary Problems       Occupational Therapy Goals          Problem: Occupational Therapy    Goal Priority Disciplines Outcome Interventions   Occupational Therapy Goal     OT, PT/OT Ongoing, Progressing    Description: Pt will perform UB dress c independence using hemitechniques by d/c.   Pt will perform toilet t/f using LRAD c independence by d/c.  Pt will perform toileting c independence by d/c.   Pt will perform tub t/f c mod I using tub chair by d/c.   Pt will be Ind c HEP and pxns Compliance by d/c.                         Time Tracking:     OT Date of Treatment: 02/08/23  OT Start Time: 0836  OT Stop Time: 0916  OT Total Time (min): 40 min    Billable Minutes:Self Care/Home Management 25  Therapeutic Exercise 15    OT/JON: OT          2/8/2023

## 2023-02-09 VITALS
TEMPERATURE: 98 F | HEIGHT: 59 IN | SYSTOLIC BLOOD PRESSURE: 121 MMHG | DIASTOLIC BLOOD PRESSURE: 71 MMHG | WEIGHT: 160.94 LBS | OXYGEN SATURATION: 93 % | HEART RATE: 96 BPM | RESPIRATION RATE: 18 BRPM | BODY MASS INDEX: 32.44 KG/M2

## 2023-02-09 LAB
ANION GAP SERPL CALC-SCNC: 9 MEQ/L
BASOPHILS # BLD AUTO: 0.02 X10(3)/MCL (ref 0–0.2)
BASOPHILS NFR BLD AUTO: 0.2 %
BUN SERPL-MCNC: 9.4 MG/DL (ref 9.8–20.1)
CALCIUM SERPL-MCNC: 8.5 MG/DL (ref 8.4–10.2)
CHLORIDE SERPL-SCNC: 102 MMOL/L (ref 98–107)
CO2 SERPL-SCNC: 27 MMOL/L (ref 23–31)
CREAT SERPL-MCNC: 0.63 MG/DL (ref 0.55–1.02)
CREAT/UREA NIT SERPL: 15
EOSINOPHIL # BLD AUTO: 0.09 X10(3)/MCL (ref 0–0.9)
EOSINOPHIL NFR BLD AUTO: 1.1 %
ERYTHROCYTE [DISTWIDTH] IN BLOOD BY AUTOMATED COUNT: 13.6 % (ref 11.5–17)
GFR SERPLBLD CREATININE-BSD FMLA CKD-EPI: >60 MLS/MIN/1.73/M2
GLUCOSE SERPL-MCNC: 104 MG/DL (ref 82–115)
HCT VFR BLD AUTO: 31.3 % (ref 37–47)
HGB BLD-MCNC: 10.4 GM/DL (ref 12–16)
IMM GRANULOCYTES # BLD AUTO: 0.03 X10(3)/MCL (ref 0–0.04)
IMM GRANULOCYTES NFR BLD AUTO: 0.4 %
LYMPHOCYTES # BLD AUTO: 0.69 X10(3)/MCL (ref 0.6–4.6)
LYMPHOCYTES NFR BLD AUTO: 8.5 %
MCH RBC QN AUTO: 31.4 PG
MCHC RBC AUTO-ENTMCNC: 33.2 MG/DL (ref 33–36)
MCV RBC AUTO: 94.6 FL (ref 80–94)
MONOCYTES # BLD AUTO: 0.7 X10(3)/MCL (ref 0.1–1.3)
MONOCYTES NFR BLD AUTO: 8.7 %
NEUTROPHILS # BLD AUTO: 6.56 X10(3)/MCL (ref 2.1–9.2)
NEUTROPHILS NFR BLD AUTO: 81.1 %
NRBC BLD AUTO-RTO: 0 %
PLATELET # BLD AUTO: 180 X10(3)/MCL (ref 130–400)
PMV BLD AUTO: 10.5 FL (ref 7.4–10.4)
POTASSIUM SERPL-SCNC: 3.5 MMOL/L (ref 3.5–5.1)
RBC # BLD AUTO: 3.31 X10(6)/MCL (ref 4.2–5.4)
SODIUM SERPL-SCNC: 138 MMOL/L (ref 136–145)
WBC # SPEC AUTO: 8.1 X10(3)/MCL (ref 4.5–11.5)

## 2023-02-09 PROCEDURE — 80048 BASIC METABOLIC PNL TOTAL CA: CPT | Performed by: ORTHOPAEDIC SURGERY

## 2023-02-09 PROCEDURE — 85025 COMPLETE CBC W/AUTO DIFF WBC: CPT | Performed by: ORTHOPAEDIC SURGERY

## 2023-02-09 PROCEDURE — G0378 HOSPITAL OBSERVATION PER HR: HCPCS

## 2023-02-09 PROCEDURE — 94761 N-INVAS EAR/PLS OXIMETRY MLT: CPT

## 2023-02-09 PROCEDURE — 25000003 PHARM REV CODE 250: Performed by: ORTHOPAEDIC SURGERY

## 2023-02-09 PROCEDURE — 94799 UNLISTED PULMONARY SVC/PX: CPT

## 2023-02-09 PROCEDURE — 97535 SELF CARE MNGMENT TRAINING: CPT

## 2023-02-09 PROCEDURE — 25000003 PHARM REV CODE 250: Performed by: NURSE PRACTITIONER

## 2023-02-09 PROCEDURE — 97110 THERAPEUTIC EXERCISES: CPT

## 2023-02-09 RX ADMIN — ATORVASTATIN CALCIUM 20 MG: 10 TABLET, FILM COATED ORAL at 08:02

## 2023-02-09 RX ADMIN — CETIRIZINE HYDROCHLORIDE 10 MG: 10 TABLET, FILM COATED ORAL at 08:02

## 2023-02-09 RX ADMIN — LEVOTHYROXINE SODIUM 75 MCG: 0.05 TABLET ORAL at 05:02

## 2023-02-09 RX ADMIN — BUSPIRONE HYDROCHLORIDE 15 MG: 10 TABLET ORAL at 08:02

## 2023-02-09 RX ADMIN — SUCRALFATE 1 G: 1 TABLET ORAL at 08:02

## 2023-02-09 RX ADMIN — VENLAFAXINE HYDROCHLORIDE 150 MG: 150 CAPSULE, EXTENDED RELEASE ORAL at 08:02

## 2023-02-09 RX ADMIN — DOCUSATE SODIUM 200 MG: 100 CAPSULE, LIQUID FILLED ORAL at 08:02

## 2023-02-09 RX ADMIN — KETOROLAC TROMETHAMINE 10 MG: 10 TABLET, FILM COATED ORAL at 05:02

## 2023-02-09 RX ADMIN — GUAIFENESIN 600 MG: 600 TABLET ORAL at 08:02

## 2023-02-09 RX ADMIN — SENNOSIDES AND DOCUSATE SODIUM 2 TABLET: 8.6; 5 TABLET ORAL at 08:02

## 2023-02-09 RX ADMIN — KETOROLAC TROMETHAMINE 10 MG: 10 TABLET, FILM COATED ORAL at 12:02

## 2023-02-09 NOTE — PLAN OF CARE
Problem: Occupational Therapy  Goal: Occupational Therapy Goal  Description: Pt will perform UB dress c independence using hemitechniques by d/c.   Pt will perform toilet t/f using LRAD c independence by d/c.  Pt will perform toileting c independence by d/c.   Pt will perform tub t/f c mod I using tub chair by d/c.   Pt will be Ind c HEP and pxns Compliance by d/c.    Outcome: Met

## 2023-02-09 NOTE — PT/OT/SLP PROGRESS
Occupational Therapy   Treatment    Name: Omaira Brito  MRN: [id number]  Admitting Diagnosis:  Rotator cuff arthropathy of right shoulder  2 Days Post-Op    Recommendations:     Discharge Recommendations: outpatient OT  Discharge Equipment Recommendations:  none  Barriers to discharge:  None    Assessment:     Omaira Brito is a 72 y.o. female with a medical diagnosis of Rotator cuff arthropathy of right shoulder.  Performance deficits affecting function are weakness, impaired endurance, impaired self care skills, impaired functional mobility, decreased upper extremity function, decreased ROM, orthopedic precautions.     Rehab Prognosis:  Good; patient would benefit from acute skilled OT services to address these deficits and reach maximum level of function.       Plan:     Patient to be seen daily (BID) to address the above listed problems via self-care/home management, therapeutic activities, therapeutic exercises  Plan of Care Expires: 02/13/23  Plan of Care Reviewed with: patient    Subjective     Pain/Comfort:  Pain Rating 1: 0/10    Objective:     Communicated with: Nursing prior to session.  Patient found supine with peripheral IV upon OT entry to room.    General Precautions: Standard, fall    Orthopedic Precautions:RUE non weight bearing  Braces: Shoulder abduction brace  Respiratory Status: Room air     Occupational Performance:     Bed Mobility:    Patient completed Supine to Sit with independence     Functional Mobility/Transfers:  Patient completed Sit <> Stand Transfer with independence  with  no assistive device   Patient completed Toilet Transfer Step Transfer technique with independence with  no AD  Functional Mobility: Patient performed functional mobility in room with independence     Activities of Daily Living:  Lower Body Dressing: independence    Toileting: independence      Treatment & Education:  Patient completed toilet transfer and HEP. Patient completed distal AROM to joints and  standing pendulums. Patient is independent with HEP    Patient left up in chair with all lines intact and call button in reach    GOALS:     Patient has met OT goals and is appropriate for D/C from acute OT services. Use as D/C note.   Multidisciplinary Problems       Occupational Therapy Goals       Not on file              Multidisciplinary Problems (Resolved)          Problem: Occupational Therapy    Goal Priority Disciplines Outcome Interventions   Occupational Therapy Goal   (Resolved)     OT, PT/OT Met    Description: Pt will perform UB dress c independence using hemitechniques by d/c.   Pt will perform toilet t/f using LRAD c independence by d/c.  Pt will perform toileting c independence by d/c.   Pt will perform tub t/f c mod I using tub chair by d/c.   Pt will be Ind c HEP and pxns Compliance by d/c.                         Time Tracking:     OT Date of Treatment: 02/09/23  OT Start Time: 1002  OT Stop Time: 1025  OT Total Time (min): 23 min    Billable Minutes:Self Care/Home Management 10  Therapeutic Exercise 13    OT/JON: OT          2/9/2023

## 2023-02-09 NOTE — PLAN OF CARE
Problem: Adult Inpatient Plan of Care  Goal: Plan of Care Review  Outcome: Met  Goal: Patient-Specific Goal (Individualized)  Outcome: Met  Goal: Absence of Hospital-Acquired Illness or Injury  Outcome: Met  Goal: Optimal Comfort and Wellbeing  Outcome: Met  Goal: Readiness for Transition of Care  Outcome: Met     Problem: Infection  Goal: Absence of Infection Signs and Symptoms  Outcome: Met     Problem: Adjustment to Surgery (Shoulder Arthroplasty)  Goal: Optimal Coping  Outcome: Met     Problem: Bleeding (Shoulder Arthroplasty)  Goal: Absence of Bleeding  Outcome: Met     Problem: Bowel Motility Impaired (Shoulder Arthroplasty)  Goal: Effective Bowel Elimination  Outcome: Met     Problem: Fluid and Electrolyte Imbalance (Shoulder Arthroplasty)  Goal: Fluid and Electrolyte Balance  Outcome: Met     Problem: Functional Ability Impaired (Shoulder Arthroplasty)  Goal: Optimal Functional Ability  Outcome: Met     Problem: Infection (Shoulder Arthroplasty)  Goal: Absence of Infection Signs and Symptoms  Outcome: Met     Problem: Neurovascular Compromise (Shoulder Arthroplasty)  Goal: Intact Neurovascular Status  Outcome: Met     Problem: Ongoing Anesthesia Effects (Shoulder Arthroplasty)  Goal: Anesthesia/Sedation Recovery  Outcome: Met     Problem: Pain (Shoulder Arthroplasty)  Goal: Acceptable Pain Control  Outcome: Met     Problem: Postoperative Nausea and Vomiting (Shoulder Arthroplasty)  Goal: Nausea and Vomiting Relief  Outcome: Met     Problem: Postoperative Urinary Retention (Shoulder Arthroplasty)  Goal: Effective Urinary Elimination  Outcome: Met     Problem: Fall Injury Risk  Goal: Absence of Fall and Fall-Related Injury  Outcome: Met     Problem: Pain Acute  Goal: Acceptable Pain Control and Functional Ability  Outcome: Met

## 2023-02-09 NOTE — NURSING
Discharge instructions given to patient and partner. Coverlets and tape given for home dressing changes. All questions were answered. Stated understanding of all instructions.

## 2023-02-10 ENCOUNTER — PATIENT MESSAGE (OUTPATIENT)
Dept: ADMINISTRATIVE | Facility: OTHER | Age: 73
End: 2023-02-10
Payer: MEDICARE

## 2023-02-11 LAB — VIEW PATHOLOGY REPORT (RELIAPATH): NORMAL

## 2023-02-12 ENCOUNTER — PATIENT MESSAGE (OUTPATIENT)
Dept: ADMINISTRATIVE | Facility: OTHER | Age: 73
End: 2023-02-12
Payer: MEDICARE

## 2023-02-14 ENCOUNTER — PATIENT MESSAGE (OUTPATIENT)
Dept: ADMINISTRATIVE | Facility: OTHER | Age: 73
End: 2023-02-14
Payer: MEDICARE

## 2023-02-17 ENCOUNTER — OFFICE VISIT (OUTPATIENT)
Dept: ORTHOPEDICS | Facility: CLINIC | Age: 73
End: 2023-02-17
Payer: MEDICARE

## 2023-02-17 DIAGNOSIS — Z96.611 S/P REVERSE TOTAL SHOULDER ARTHROPLASTY, RIGHT: Primary | ICD-10-CM

## 2023-02-17 PROCEDURE — 99024 PR POST-OP FOLLOW-UP VISIT: ICD-10-PCS | Mod: POP,,, | Performed by: ORTHOPAEDIC SURGERY

## 2023-02-17 PROCEDURE — 99024 POSTOP FOLLOW-UP VISIT: CPT | Mod: POP,,, | Performed by: ORTHOPAEDIC SURGERY

## 2023-02-17 NOTE — PROGRESS NOTES
Chief Complaint:   Chief Complaint   Patient presents with    Right Shoulder - Post-op Evaluation    Post-op Evaluation     10 day sp right RTS 2/7/23 GL 5/8/23, patient presents today in sling and has no complaints       History of present illness:  2/7/23: Right Reverse total shoulder arthroplasty    She returns today. Her pain is under good control. She is compliant in the sling.     Musculoskeletal:   Right shoulder incision healing, steri strips intact. She has a hematoma medial to her incision. Without erythema, drainage, or signs of infection. Distally neurovascular intact.          Assessment: S/P reverse total shoulder arthroplasty, right        Plan:  Doing well s/p above. We will keep her out of therapy for another 2-4 weeks to let the hematoma resolve.  Ice is important.  Follow up in 2 weeks for recheck.

## 2023-02-18 ENCOUNTER — PATIENT MESSAGE (OUTPATIENT)
Dept: ADMINISTRATIVE | Facility: OTHER | Age: 73
End: 2023-02-18
Payer: MEDICARE

## 2023-02-22 ENCOUNTER — PATIENT MESSAGE (OUTPATIENT)
Dept: ADMINISTRATIVE | Facility: OTHER | Age: 73
End: 2023-02-22
Payer: MEDICARE

## 2023-02-23 NOTE — DISCHARGE SUMMARY
[facility]  Orthopedic Surgery  Inpatient Discharge Summary:    Patient Name: Omaira Brito  MRN: [id number]  Admission Date: 2/7/2023  Attending Provider:  No att. providers found  Primary Care Provider: Mickey Lima MD      Discharge Date/Time: 2/9/2023 12:20 PM   Discharge Provider: Marta Medina    Diagnoses:   Active Hospital Problems    Diagnosis  POA    *Rotator cuff arthropathy of right shoulder [M12.811]  Yes      Resolved Hospital Problems   No resolved problems to display.       Discharge Condition: Stable    Hospital Course: The patient was admitted for elective right RTSA. Patient tolerated the surgery well with no immediate postoperative complications. Patient progressed with physical therapy. Patient's pain was managed appropriately. Patient tolerated a diet. Patient is now stable and ready for discharge. The patient was given thorough discharge instructions and postoperative protocols. Patient will continue previously established pain control and DVT prophylaxis. Patient will follow up as an outpatient with Dr. Ron Apple in 2 weeks.     Final Diagnoses: Same as principal problem.    Disposition: home        Medications:  Reconciled Home Medications:      Medication List        START taking these medications      aspirin 81 MG EC tablet  Commonly known as: ECOTRIN  Take 1 tablet (81 mg total) by mouth every 12 (twelve) hours. Blood clot prevention            CONTINUE taking these medications      ascorbic acid (vitamin C) 500 MG tablet  Commonly known as: VITAMIN C  Take 500 mg by mouth nightly.     balsalazide 750 mg capsule  Commonly known as: COLAZAL  Take 1,500 mg by mouth 3 (three) times daily.     busPIRone 15 MG tablet  Commonly known as: BUSPAR  Take 15 mg by mouth once daily.     calcium carbonate 500 mg calcium (1,250 mg) chewable tablet  Commonly known as: OS-DEISY  Take 1 tablet by mouth 2 (two) times daily.     CEREFOLIN ORAL  Take 1 tablet by mouth Daily.      cetirizine 10 MG tablet  Commonly known as: ZYRTEC  Take 10 mg by mouth once daily.     chlorpheniramine 4 mg tablet  Commonly known as: CHLOR-TRIMETON  Take 4 mg by mouth once daily.     cholecalciferol (vitamin D3) 125 mcg (5,000 unit) Tab  Take 5,000 Units by mouth once daily.     cyanocobalamin 2000 MCG tablet  Take 2,500 mcg by mouth.     * doxepin 25 MG capsule  Commonly known as: SINEQUAN  Take 25 mg by mouth every evening.     * doxepin 100 MG capsule  Commonly known as: SINEQUAN  Take 100 mg by mouth every evening.     estradioL 1 MG tablet  Commonly known as: ESTRACE  Take 1 mg by mouth once daily.     FIORICET ORAL  Take 1 tablet by mouth as needed.     fluticasone propionate 50 mcg/actuation nasal spray  Commonly known as: FLONASE  1 spray by Nasal route as needed.     hyoscyamine 0.125 mg Subl  Commonly known as: LEVSIN/SL  Take 0.125 mg by mouth daily as needed.     KRILL OIL ORAL  Take 1 capsule by mouth every evening.     levothyroxine 75 MCG tablet  Commonly known as: SYNTHROID  Take 75 mcg by mouth before breakfast.     MAXZIDE-25MG ORAL  Take 0.5 tablets by mouth Daily.     meclizine 25 MG tablet  Commonly known as: ANTIVERT  Take 25 mg by mouth 3 (three) times daily as needed.     naproxen 500 MG tablet  Commonly known as: NAPROSYN  Take 500 mg by mouth once daily.     NON FORMULARY MEDICATION  Take 2 tablets by mouth Daily.     pantoprazole 40 MG tablet  Commonly known as: PROTONIX  Take 40 mg by mouth 2 (two) times daily.     POTASSIUM CHLORIDE ORAL  Take by mouth nightly.     simvastatin 40 MG tablet  Commonly known as: ZOCOR  Take 40 mg by mouth every evening.     sucralfate 1 gram tablet  Commonly known as: CARAFATE  Take 1 g by mouth 2 (two) times daily.     venlafaxine 150 MG Cp24  Commonly known as: EFFEXOR-XR  Take 150 mg by mouth once daily.     vitamin E 400 UNIT capsule  Take 400 Units by mouth nightly.           * This list has 2 medication(s) that are the same as other medications  prescribed for you. Read the directions carefully, and ask your doctor or other care provider to review them with you.                ASK your doctor about these medications      methocarbamoL 750 MG Tab  Commonly known as: ROBAXIN  Take 1 tablet (750 mg total) by mouth every 6 (six) hours. for 14 days  Ask about: Should I take this medication?     oxyCODONE-acetaminophen 5-325 mg per tablet  Commonly known as: PERCOCET  Take 1 tablet by mouth every 4 (four) hours as needed for Pain.  Ask about: Should I take this medication?     polyethylene glycol 17 gram Pwpk  Commonly known as: GLYCOLAX  Take 17 g by mouth once daily. Constipation Prevention for 14 days  Ask about: Should I take this medication?            No discharge procedures on file.   Follow-up Information       Ron Apple Jr, MD. Go on 2/17/2023.    Specialty: Orthopedic Surgery  Why: Ortho follow up appt on Friday 2/17/23 @ 8:00am.  Contact information:  [address] [place] [address]  [address] [place] [address] [id number] [phone]

## 2023-02-26 ENCOUNTER — PATIENT MESSAGE (OUTPATIENT)
Dept: ADMINISTRATIVE | Facility: OTHER | Age: 73
End: 2023-02-26
Payer: MEDICARE

## 2023-02-28 ENCOUNTER — PATIENT MESSAGE (OUTPATIENT)
Dept: ADMINISTRATIVE | Facility: OTHER | Age: 73
End: 2023-02-28
Payer: MEDICARE

## 2023-03-01 ENCOUNTER — HOSPITAL ENCOUNTER (OUTPATIENT)
Dept: RADIOLOGY | Facility: HOSPITAL | Age: 73
Discharge: HOME | End: 2023-03-01
Attending: OTOLARYNGOLOGY
Payer: MEDICARE

## 2023-03-01 DIAGNOSIS — R05.3 CHRONIC COUGH: ICD-10-CM

## 2023-03-01 DIAGNOSIS — R05.3 CHRONIC COUGH: Primary | ICD-10-CM

## 2023-03-01 PROCEDURE — 71046 X-RAY EXAM CHEST 2 VIEWS: CPT | Mod: TC

## 2023-03-02 ENCOUNTER — PATIENT MESSAGE (OUTPATIENT)
Dept: ADMINISTRATIVE | Facility: OTHER | Age: 73
End: 2023-03-02
Payer: MEDICARE

## 2023-03-06 ENCOUNTER — HOSPITAL ENCOUNTER (OUTPATIENT)
Dept: RADIOLOGY | Facility: CLINIC | Age: 73
Discharge: HOME | End: 2023-03-06
Attending: ORTHOPAEDIC SURGERY
Payer: MEDICARE

## 2023-03-06 ENCOUNTER — OFFICE VISIT (OUTPATIENT)
Dept: ORTHOPEDICS | Facility: CLINIC | Age: 73
End: 2023-03-06
Payer: MEDICARE

## 2023-03-06 VITALS
SYSTOLIC BLOOD PRESSURE: 114 MMHG | DIASTOLIC BLOOD PRESSURE: 69 MMHG | WEIGHT: 160 LBS | HEART RATE: 76 BPM | BODY MASS INDEX: 32.25 KG/M2 | HEIGHT: 59 IN

## 2023-03-06 DIAGNOSIS — Z96.611 S/P REVERSE TOTAL SHOULDER ARTHROPLASTY, RIGHT: ICD-10-CM

## 2023-03-06 DIAGNOSIS — Z96.611 S/P REVERSE TOTAL SHOULDER ARTHROPLASTY, RIGHT: Primary | ICD-10-CM

## 2023-03-06 PROCEDURE — 99024 PR POST-OP FOLLOW-UP VISIT: ICD-10-PCS | Mod: POP,,, | Performed by: ORTHOPAEDIC SURGERY

## 2023-03-06 PROCEDURE — 73030 XR SHOULDER COMPLETE 2 OR MORE VIEWS RIGHT: ICD-10-PCS | Mod: RT,,, | Performed by: ORTHOPAEDIC SURGERY

## 2023-03-06 PROCEDURE — 73030 X-RAY EXAM OF SHOULDER: CPT | Mod: RT,,, | Performed by: ORTHOPAEDIC SURGERY

## 2023-03-06 PROCEDURE — 99024 POSTOP FOLLOW-UP VISIT: CPT | Mod: POP,,, | Performed by: ORTHOPAEDIC SURGERY

## 2023-03-06 NOTE — PROGRESS NOTES
Chief Complaint:   Chief Complaint   Patient presents with    Right Shoulder - Pain    Shoulder Pain     4 wk sp right RTSA sx 2/7/23 GL 5/8/23, patient presents today in sling and said the swelling has gone down       History of present illness:  2/7/23: Right Reverse total shoulder arthroplasty    She returns today.  Her pain is improving.  Her swelling has resolved.    Musculoskeletal:   Incision well healed.  Distally she is neurovascular intact     Assessment: S/P reverse total shoulder arthroplasty, right          Plan:  Doing well status post above.  Will start formal physical therapy on March 20th.  She can also remove the sling on March 20th.  I will see her back in 6 weeks radiographs of the right shoulder

## 2023-04-03 ENCOUNTER — HOSPITAL ENCOUNTER (OUTPATIENT)
Dept: RADIOLOGY | Facility: HOSPITAL | Age: 73
Discharge: HOME | End: 2023-04-03
Attending: INTERNAL MEDICINE
Payer: MEDICARE

## 2023-04-03 DIAGNOSIS — R05.9 COUGH: Primary | ICD-10-CM

## 2023-04-03 DIAGNOSIS — R05.9 COUGH: ICD-10-CM

## 2023-04-03 DIAGNOSIS — J69.0 ASPIRATION PNEUMONIA: ICD-10-CM

## 2023-04-03 PROCEDURE — 71046 X-RAY EXAM CHEST 2 VIEWS: CPT | Mod: TC

## 2023-04-24 ENCOUNTER — OFFICE VISIT (OUTPATIENT)
Dept: ORTHOPEDICS | Facility: CLINIC | Age: 73
End: 2023-04-24
Payer: MEDICARE

## 2023-04-24 ENCOUNTER — HOSPITAL ENCOUNTER (OUTPATIENT)
Dept: RADIOLOGY | Facility: CLINIC | Age: 73
Discharge: HOME | End: 2023-04-24
Attending: ORTHOPAEDIC SURGERY
Payer: MEDICARE

## 2023-04-24 VITALS — WEIGHT: 152 LBS | HEIGHT: 59 IN | BODY MASS INDEX: 30.64 KG/M2

## 2023-04-24 DIAGNOSIS — Z96.611 S/P REVERSE TOTAL SHOULDER ARTHROPLASTY, RIGHT: Primary | ICD-10-CM

## 2023-04-24 DIAGNOSIS — Z96.611 S/P REVERSE TOTAL SHOULDER ARTHROPLASTY, RIGHT: ICD-10-CM

## 2023-04-24 PROCEDURE — 73030 XR SHOULDER COMPLETE 2 OR MORE VIEWS RIGHT: ICD-10-PCS | Mod: RT,,, | Performed by: ORTHOPAEDIC SURGERY

## 2023-04-24 PROCEDURE — 99024 POSTOP FOLLOW-UP VISIT: CPT | Mod: POP,,, | Performed by: ORTHOPAEDIC SURGERY

## 2023-04-24 PROCEDURE — 73030 X-RAY EXAM OF SHOULDER: CPT | Mod: RT,,, | Performed by: ORTHOPAEDIC SURGERY

## 2023-04-24 PROCEDURE — 99024 PR POST-OP FOLLOW-UP VISIT: ICD-10-PCS | Mod: POP,,, | Performed by: ORTHOPAEDIC SURGERY

## 2023-04-24 NOTE — PROGRESS NOTES
Chief Complaint:   Chief Complaint   Patient presents with    Right Shoulder - Pain    Shoulder Pain     11 wk sp right RTSA sx 2/7/23 GL 5/8/23, patient presents today with no complaints and PT is going well       History of present illness:  2/7/23: Right Reverse total shoulder arthroplasty    She returns today.  Her pain is improving.  Her swelling has resolved.    Musculoskeletal:   Incision well healed.  Her range of motion is full except she is limited with internal rotation to her pocket.      Assessment: S/P reverse total shoulder arthroplasty, right  -     X-ray Shoulder 2 or More Views Right; Future; Expected date: 04/24/2023          Plan:  Doing well status post above.  Finish up formal physical therapy.  I will see her back in 12 weeks radiographs of the right shoulder

## 2023-06-02 ENCOUNTER — HOSPITAL ENCOUNTER (OUTPATIENT)
Dept: RADIOLOGY | Facility: HOSPITAL | Age: 73
Discharge: HOME | End: 2023-06-02
Attending: INTERNAL MEDICINE
Payer: MEDICARE

## 2023-06-02 ENCOUNTER — PROCEDURE VISIT (OUTPATIENT)
Dept: RESPIRATORY THERAPY | Facility: HOSPITAL | Age: 73
End: 2023-06-02
Attending: INTERNAL MEDICINE
Payer: MEDICARE

## 2023-06-02 ENCOUNTER — CLINICAL SUPPORT (OUTPATIENT)
Dept: REHABILITATION | Facility: HOSPITAL | Age: 73
End: 2023-06-02
Attending: INTERNAL MEDICINE
Payer: MEDICARE

## 2023-06-02 DIAGNOSIS — R49.9 HOARSENESS OR CHANGING VOICE: ICD-10-CM

## 2023-06-02 DIAGNOSIS — G36.1: ICD-10-CM

## 2023-06-02 DIAGNOSIS — R91.8 OTHER NONSPECIFIC ABNORMAL FINDING OF LUNG FIELD: ICD-10-CM

## 2023-06-02 DIAGNOSIS — R13.10 PROBLEMS WITH SWALLOWING AND MASTICATION: ICD-10-CM

## 2023-06-02 DIAGNOSIS — R49.9 VOICE IMPAIRMENT: ICD-10-CM

## 2023-06-02 DIAGNOSIS — R13.10 DYSPHAGIA, UNSPECIFIED TYPE: ICD-10-CM

## 2023-06-02 DIAGNOSIS — R49.9 VOICE IMPAIRMENT: Primary | ICD-10-CM

## 2023-06-02 LAB
DLCO ADJ PRE: 10.08 ML/(MIN*MMHG) (ref 12.34–23.8)
DLCO SINGLE BREATH LLN: 12.34
DLCO SINGLE BREATH PRE REF: 55.8 %
DLCO SINGLE BREATH REF: 18.07
DLCOC SBVA LLN: 2.64
DLCOC SBVA PRE REF: 87 %
DLCOC SBVA REF: 4.4
DLCOC SINGLE BREATH LLN: 12.34
DLCOC SINGLE BREATH PRE REF: 55.8 %
DLCOC SINGLE BREATH REF: 18.07
DLCOVA LLN: 2.64
DLCOVA PRE REF: 87 %
DLCOVA PRE: 3.82 ML/(MIN*MMHG*L) (ref 2.64–6.15)
DLCOVA REF: 4.4
DLVAADJ PRE: 3.82 ML/(MIN*MMHG*L) (ref 2.64–6.15)
ERV LLN: -16449.42
ERV PRE REF: 12.9 %
ERV REF: 0.58
FEF 25 75 LLN: 0.73
FEF 25 75 PRE REF: 101.7 %
FEF 25 75 REF: 1.61
FEV1 FVC LLN: 65
FEV1 FVC PRE REF: 105.4 %
FEV1 FVC REF: 79
FEV1 LLN: 1.31
FEV1 PRE REF: 78.4 %
FEV1 REF: 1.82
FRCPLETH LLN: 1.61
FRCPLETH PREREF: 45.7 %
FRCPLETH REF: 2.43
FVC LLN: 1.68
FVC PRE REF: 74 %
FVC REF: 2.32
IVC PRE: 1.69 L (ref 1.68–2.96)
IVC SINGLE BREATH LLN: 1.68
IVC SINGLE BREATH PRE REF: 72.6 %
IVC SINGLE BREATH REF: 2.32
MVV LLN: 53
MVV PRE REF: 129 %
MVV REF: 62
PEF LLN: 3.38
PEF PRE REF: 125.1 %
PEF REF: 4.84
PRE DLCO: 10.08 ML/(MIN*MMHG) (ref 12.34–23.8)
PRE ERV: 0.07 L (ref -16449.42–16450.58)
PRE FEF 25 75: 1.64 L/S (ref 0.73–2.49)
PRE FET 100: 6.7 SEC
PRE FEV1 FVC: 82.79 % (ref 64.92–92.23)
PRE FEV1: 1.42 L (ref 1.31–2.32)
PRE FRC PL: 1.11 L
PRE FVC: 1.72 L (ref 1.68–2.96)
PRE MVV: 80 L/MIN (ref 52.7–71.3)
PRE PEF: 6.06 L/S (ref 3.38–6.3)
PRE RV: 1 L (ref 1.28–2.43)
PRE TLC: 2.72 L (ref 3.12–5.1)
RAW LLN: 3.06
RAW PRE REF: 79.4 %
RAW PRE: 2.43 CMH2O*S/L (ref 3.06–3.06)
RAW REF: 3.06
RV LLN: 1.28
RV PRE REF: 54.1 %
RV REF: 1.85
RVTLC LLN: 34
RVTLC PRE REF: 85.4 %
RVTLC PRE: 36.79 % (ref 33.51–52.69)
RVTLC REF: 43
TLC LLN: 3.12
TLC PRE REF: 66.2 %
TLC REF: 4.11
VA PRE: 2.64 L (ref 3.96–3.96)
VA SINGLE BREATH LLN: 3.96
VA SINGLE BREATH PRE REF: 66.7 %
VA SINGLE BREATH REF: 3.96
VC LLN: 1.68
VC PRE REF: 74 %
VC PRE: 1.72 L (ref 1.68–2.96)
VC REF: 2.32
VTGRAWPRE: 1.43 L

## 2023-06-02 PROCEDURE — 92611 MOTION FLUOROSCOPY/SWALLOW: CPT

## 2023-06-02 PROCEDURE — 74230 X-RAY XM SWLNG FUNCJ C+: CPT | Mod: TC

## 2023-06-02 PROCEDURE — 97535 SELF CARE MNGMENT TRAINING: CPT

## 2023-06-02 PROCEDURE — 99900031 HC PATIENT EDUCATION (STAT)

## 2023-06-02 PROCEDURE — 99900035 HC TECH TIME PER 15 MIN (STAT)

## 2023-06-02 PROCEDURE — 76000 FLUOROSCOPY <1 HR PHYS/QHP: CPT | Mod: 59,TC

## 2023-06-02 RX ORDER — ALBUTEROL SULFATE 0.83 MG/ML
2.5 SOLUTION RESPIRATORY (INHALATION)
Status: DISPENSED | OUTPATIENT
Start: 2023-06-02

## 2023-06-02 NOTE — PROGRESS NOTES
Speech Language Pathology Department  Modified Barium Swallow (MBS) Study    Patient Name:  Omaira Brito   MRN:  [id number]    Recommendations:     General recommendations:   consider dysphagia therapy and use of mildly thick liquids with a small cup sip strategy if problems persist and/or worsen  Diet recommendations: regular solids and thin liquids with use of a small cup sip strategy; if problems persist and/or worsen, consider mildly thick liquids  Swallow strategies/precautions: medications whole one at a time with liquid or puree texture  General precautions: Standard,      History/Reason for Referral:     Past Medical History:   Diagnosis Date    HLD (hyperlipidemia)     Osteoarthritis     Thyroid disease      Past Surgical History:   Procedure Laterality Date    ADENOIDECTOMY      ANTERIOR CERVICAL DISCECTOMY W/ FUSION      APPENDECTOMY      BACK SURGERY      fusion    BREAST SURGERY      BUNIONECTOMY Bilateral     BUNIONECTOMY Left     with foot reconstruction    BUNIONECTOMY Right     CARPAL TUNNEL RELEASE Bilateral     CATARACT EXTRACTION W/  INTRAOCULAR LENS IMPLANT Bilateral     DILATION AND CURETTAGE OF UTERUS      EYE SURGERY      HYSTERECTOMY      JOINT REPLACEMENT Left     total knee    LAPAROSCOPIC REPAIR OF HIATAL HERNIA      REDUCTION OF BOTH BREASTS      with abdominoplasty    REVERSE TOTAL SHOULDER ARTHROPLASTY Right 02/07/2023    Procedure: ARTHROPLASTY, SHOULDER, TOTAL, REVERSE - FX;  Surgeon: Ron Apple Jr., MD;  Location: [facility];  Service: Orthopedics;  Laterality: Right;    SHOULDER ARTHROSCOPY W/ ROTATOR CUFF REPAIR Bilateral     SPINE SURGERY      TONSILLECTOMY      adenoidectomy    TOTAL KNEE ARTHROPLASTY Right      A MBS Study was completed to assess the efficiency of her swallow function, rule out aspiration and make recommendations regarding safe dietary consistencies, effective compensatory strategies, and safe eating environment.     Home Diet: Regular and thin  "liquids  Current Method of Nutrition: PO diet      Patient complaint: Pt with complaints of a persist cough since her right shoulder surgery in February 2023. States during her hospitalization she experienced an aspiration event of her medication resulting in a hypoxic episode. This resolved but her cough with sputum production persisted. She additionally expressed concerns for vocal chances including hoarseness which she states "is actually better today." Pt reports she was seen by her ENT, Dr. Daniels, and endorsed "something about my vocal cords not moving right." She states she has been started on a new medication for her problems with her mucous.     Subjective:     Patient alert and cooperative.    Spiritual/Cultural/Yazidi Beliefs/Practices that affect care: no  Pain/Comfort: none    Respiratory Status: room air  Restraints/positioning devices: none    Radiologist: Flako Vee MD    Fluoroscopic Results:     Oral Musculature  Dentition: upper dentures and lower dentures  Secretion Management: adequate  Mucosal Quality: good  Facial Movement: WFL  Buccal Strength & Mobility: WFL  Mandibular Strength & Mobility: WFL  Oral Labial Strength & Mobility: WFL  Lingual Strength & Mobility: WFL  Vocal Quality: hoarse  Volitional Cough: Productive  Volitional Swallow: present    Positioning:  upright in chair  Visualization  Patient was seen in the lateral view  Cervical hardware visualized    Oral Phase:   Adequate lip closure  Adequate bolus formation  Adequate anterior-posterior transport  Adequate mastication    Pharyngeal Phase:   Timely swallow reflex  Adequate base of tongue retraction  Reduced hyolaryngeal excursion  Reduced arytenoid/vocal fold closure  Reduced airway protection  Laryngeal penetration with thin liquid and mildly thick liquid consecutive swallows    Consistency Laryngeal Penetration Aspiration Residue   Thin liquid by cup - consecutive swallows During the swallow  Did NOT clear None None "   Mildly thick liquid by cup (consecutive swallows) During the swallow None None   Moderately thick liquid by cup None None None   Mildly thick liquid by cup (small cup sip strategy) None None None   Puree None None None   Chewable solid None None None   Mildly thick liquid by straw During the swallow None None   Mildly thick liquid by cup (small cup sip) None None None   Thin liquid by cup During the swallow  Cleared spontaneously None None     Cervical Esophageal Phase:   residual material visualized  decreased UES opening    Compensatory Strategies:  Hard throat clear reswallow cleared laryngeal vestibule from penetrated material  Small cup sip strategy improved airway protection with reducing presence and persistence of laryngeal penetration    Assessment:     Pt exhibited mild oropharyngeal dysphagia characterized by the findings noted above.  Laryngeal penetration of thin liquid via cup during consecutive swallows did NOT clear spontaneously; however a hard throat clear and a re-swallow cleared the penetrated material. A small cup sip strategy was also effective for reducing penetration episodes with thin and mildly thick liquids. Laryngeal penetration of mildly thick liquid during consecutive swallows was also appreciated. Again, a small sip strategy was effective for assisting with preventing laryngeal penetration and improving her airway protection.  No aspiration was visualized during this study.Swallow efficiency is preserved; however, swallow safety is impaired.    Patient appears to be at low-moderate risk for aspiration related pneumonia when considering good oral health status, overall health/immune status, reduced laryngeal vestibule closure, and severity of dysphagia.  Prognosis for behavioral swallow rehabilitation is good.     Goals:     Patient Education:     Patient provided with verbal and video education regarding results/recommendations.  Understanding was verbalized.    Time Tracking:      SLP Treatment Date:   6/2/23  Speech Start Time:  1300  Speech Stop Time: 1345   Speech Total Time (min):  45    Billable minutes:   Motion Fluoroscopic Evaluation, Video Recording, 30 minutes  Self Care/Home Management, 15 minutes     06/02/2023

## 2023-08-02 ENCOUNTER — HOSPITAL ENCOUNTER (OUTPATIENT)
Dept: RADIOLOGY | Facility: CLINIC | Age: 73
Discharge: HOME | End: 2023-08-02
Attending: ORTHOPAEDIC SURGERY
Payer: MEDICARE

## 2023-08-02 ENCOUNTER — OFFICE VISIT (OUTPATIENT)
Dept: ORTHOPEDICS | Facility: CLINIC | Age: 73
End: 2023-08-02
Payer: MEDICARE

## 2023-08-02 VITALS — HEIGHT: 59 IN | WEIGHT: 159.63 LBS | BODY MASS INDEX: 32.18 KG/M2

## 2023-08-02 DIAGNOSIS — M12.811 ROTATOR CUFF ARTHROPATHY OF RIGHT SHOULDER: ICD-10-CM

## 2023-08-02 DIAGNOSIS — M12.811 ROTATOR CUFF ARTHROPATHY OF RIGHT SHOULDER: Primary | ICD-10-CM

## 2023-08-02 PROCEDURE — 73030 X-RAY EXAM OF SHOULDER: CPT | Mod: RT,,, | Performed by: ORTHOPAEDIC SURGERY

## 2023-08-02 PROCEDURE — 99213 OFFICE O/P EST LOW 20 MIN: CPT | Mod: ,,, | Performed by: ORTHOPAEDIC SURGERY

## 2023-08-02 PROCEDURE — 99213 PR OFFICE/OUTPT VISIT, EST, LEVL III, 20-29 MIN: ICD-10-PCS | Mod: ,,, | Performed by: ORTHOPAEDIC SURGERY

## 2023-08-02 PROCEDURE — 73030 XR SHOULDER COMPLETE 2 OR MORE VIEWS RIGHT: ICD-10-PCS | Mod: RT,,, | Performed by: ORTHOPAEDIC SURGERY

## 2023-08-02 NOTE — PROGRESS NOTES
Chief Complaint:   Chief Complaint   Patient presents with    Right Shoulder - Post-op Evaluation     6m s/p right RTSA w/ xrays. Sx 2/7/23.. Reports having trouble reaching around to her back with her right arm.        Consulting Physician: No ref. provider found    History of present illness:  2/7/23: Right Reverse total shoulder arthroplasty     She returns today. Her pain is under good control. Her main complaint is difficulty reaching behind her. She does report some mild strength deficit.     Past Medical History:   Diagnosis Date    HLD (hyperlipidemia)     Osteoarthritis     Thyroid disease        Past Surgical History:   Procedure Laterality Date    ADENOIDECTOMY      ANTERIOR CERVICAL DISCECTOMY W/ FUSION      APPENDECTOMY      BACK SURGERY      fusion    BREAST SURGERY      BUNIONECTOMY Bilateral     BUNIONECTOMY Left     with foot reconstruction    BUNIONECTOMY Right     CARPAL TUNNEL RELEASE Bilateral     CATARACT EXTRACTION W/  INTRAOCULAR LENS IMPLANT Bilateral     DILATION AND CURETTAGE OF UTERUS      EYE SURGERY      HYSTERECTOMY      JOINT REPLACEMENT Left     total knee    LAPAROSCOPIC REPAIR OF HIATAL HERNIA      REDUCTION OF BOTH BREASTS      with abdominoplasty    REVERSE TOTAL SHOULDER ARTHROPLASTY Right 02/07/2023    Procedure: ARTHROPLASTY, SHOULDER, TOTAL, REVERSE - FX;  Surgeon: Ron Apple Jr., MD;  Location: [facility];  Service: Orthopedics;  Laterality: Right;    SHOULDER ARTHROSCOPY W/ ROTATOR CUFF REPAIR Bilateral     SPINE SURGERY      TONSILLECTOMY      adenoidectomy    TOTAL KNEE ARTHROPLASTY Right        Current Outpatient Medications   Medication Sig    ascorbic acid, vitamin C, (VITAMIN C) 500 MG tablet Take 500 mg by mouth nightly.    balsalazide (COLAZAL) 750 mg capsule Take 1,500 mg by mouth 3 (three) times daily.    busPIRone (BUSPAR) 15 MG tablet Take 15 mg by mouth once daily.    butalb/acetaminophen/caffeine (FIORICET ORAL) Take 1 tablet by mouth as needed.    calcium  carbonate (OS-DEISY) 500 mg calcium (1,250 mg) chewable tablet Take 1 tablet by mouth 2 (two) times daily.    cetirizine (ZYRTEC) 10 MG tablet Take 10 mg by mouth once daily.    cholecalciferol, vitamin D3, 125 mcg (5,000 unit) Tab Take 5,000 Units by mouth once daily.    cyanocobalamin 2000 MCG tablet Take 2,500 mcg by mouth.    doxepin (SINEQUAN) 100 MG capsule Take 200 mg by mouth every evening.    estradioL (ESTRACE) 1 MG tablet Take 1 mg by mouth once daily.    fluticasone propionate (FLONASE) 50 mcg/actuation nasal spray 1 spray by Nasal route as needed.    hyoscyamine (LEVSIN/SL) 0.125 mg Subl Take 0.125 mg by mouth daily as needed.    KRILL OIL ORAL Take 1 capsule by mouth every evening.    levothyroxine (SYNTHROID) 75 MCG tablet Take 75 mcg by mouth before breakfast.    meclizine (ANTIVERT) 25 MG tablet Take 25 mg by mouth 3 (three) times daily as needed.    naproxen (NAPROSYN) 500 MG tablet Take 500 mg by mouth once daily.    NON FORMULARY MEDICATION Take 2 tablets by mouth Daily. Flojen probiotic    pantoprazole (PROTONIX) 40 MG tablet Take 40 mg by mouth 2 (two) times daily.    POTASSIUM CHLORIDE ORAL Take by mouth nightly.    simvastatin (ZOCOR) 40 MG tablet Take 40 mg by mouth every evening.    sucralfate (CARAFATE) 1 gram tablet Take 1 g by mouth 2 (two) times daily.    triamterene/hydrochlorothiazid (MAXZIDE-25MG ORAL) Take 0.5 tablets by mouth Daily.    venlafaxine (EFFEXOR-XR) 150 MG Cp24 Take 150 mg by mouth once daily.    vit B12/levomefolate/vit B6/B2 (CEREFOLIN ORAL) Take 1 tablet by mouth Daily.    vitamin E 400 UNIT capsule Take 400 Units by mouth nightly.     Current Facility-Administered Medications   Medication    albuterol nebulizer solution 2.5 mg       Review of patient's allergies indicates:   Allergen Reactions    Amoxicillin-pot clavulanate Diarrhea and Other (See Comments)     Cause erosion, severe bleeding       Codeine Itching    Diphenhydramine hcl Other (See Comments)     Other  "reaction(s): HYPERACTIVE  Hyperactivity, restless      Hydrocodone-acetaminophen Itching     mild       Family History   Problem Relation Age of Onset    Arthritis Mother     Depression Mother     Hypertension Mother     Cancer Father        Social History     Socioeconomic History    Marital status: Significant Other   Tobacco Use    Smoking status: Never    Smokeless tobacco: Never   Substance and Sexual Activity    Alcohol use: Not Currently    Drug use: Never    Sexual activity: Not Currently     Partners: Female, Male       Review of Systems:    Constitution:   Denies chills, fever, and sweats.  HENT:   Denies headaches or blurry vision.  Cardiovascular:  Denies chest pain or irregular heart beat.  Respiratory:   Denies cough or shortness of breath.  Gastrointestinal:  Denies abdominal pain, nausea, or vomiting.  Musculoskeletal:   Denies muscle cramps.  Neurological:   Denies dizziness or focal weakness.  Psychiatric/Behavior: Normal mental status.  Hematology/Lymph:  Denies bleeding problem or easy bruising/bleeding.  Skin:    Denies rash or suspicious lesions.    Examination:    Vital Signs:    Vitals:    08/02/23 1301   Weight: 72.4 kg (159 lb 9.6 oz)   Height: 4' 11" (1.499 m)       Body mass index is 32.24 kg/m².    Constitution:   Well-developed, well nourished patient in no acute distress.  Neurological:   Alert and oriented x 3 and cooperative to examination.     Psychiatric/Behavior: Normal mental status.  Respiratory:   No shortness of breath.  Cards:    Pulses palpable and symmetric, brisk cap refill   Eyes:    Extraoccular muscles intact  Skin:    No scars, rash or suspicious lesions.    MSK:   Shoulder Exam:                   Right        Left  Skin:                                   Normal     Normal  AC joint tenderness:           None         None  Forward Flexion:                180            180  Abduction:                          180           180  External Rotation:               80       "        80  Internal Rotation:                40             80  Supraspinatus stress test: Neg           Neg  Hawkin's Impingement:     Neg           Neg  Neer Impingement:            Neg           Neg  Apprehension:                   Neg           Neg  Tom Green's:                           Neg           Neg  Speed's test:                     Neg            Neg  Strength:  External Rotation:           5/5                5/5  Lift Off/belly press:          5/5                5/5    N-V status:                   Intact             Intact    C-spine: Normal ROM, NT      Imaging: X-rays ordered and images interpreted today personally by me of 4 views of the right shoulder show appropriate implant position.       Assessment: Rotator cuff arthropathy of right shoulder  -     X-ray Shoulder 2 or More Views Right; Future; Expected date: 08/02/2023        Plan:  Doing well s/p above. Activities as tolerated. Follow up at the one year frank with repeat xrays of the right shoulder.     Ron Apple MD personally performed the services described in this documentation, including but not limited to patient's history, physical examination, and assessment and plan of care. All medical record entries made by JADEN Nunez were performed at his direction and in his presence. The medical record was reviewed and is accurate and complete.

## 2023-11-02 ENCOUNTER — LAB VISIT (OUTPATIENT)
Dept: LAB | Facility: HOSPITAL | Age: 73
End: 2023-11-02
Attending: INTERNAL MEDICINE
Payer: MEDICARE

## 2023-11-02 DIAGNOSIS — E78.00 HYPERCHOLESTEREMIA: ICD-10-CM

## 2023-11-02 DIAGNOSIS — R53.83 FATIGUE, UNSPECIFIED TYPE: Primary | ICD-10-CM

## 2023-11-02 DIAGNOSIS — Z79.899 ENCOUNTER FOR LONG-TERM (CURRENT) USE OF OTHER MEDICATIONS: ICD-10-CM

## 2023-11-02 DIAGNOSIS — Z00.00 WELL ADULT EXAM: ICD-10-CM

## 2023-11-02 DIAGNOSIS — E55.9 VITAMIN D DEFICIENCY: ICD-10-CM

## 2023-11-02 DIAGNOSIS — I10 PRIMARY HYPERTENSION: ICD-10-CM

## 2023-11-02 DIAGNOSIS — Z12.31 VISIT FOR SCREENING MAMMOGRAM: Primary | ICD-10-CM

## 2023-11-02 DIAGNOSIS — Z82.49 FAMILY HISTORY OF ISCHEMIC HEART DISEASE: ICD-10-CM

## 2023-11-02 LAB
ALBUMIN SERPL-MCNC: 3.5 G/DL (ref 3.4–4.8)
ALBUMIN/GLOB SERPL: 0.9 RATIO (ref 1.1–2)
ALP SERPL-CCNC: 57 UNIT/L (ref 40–150)
ALT SERPL-CCNC: 18 UNIT/L (ref 0–55)
AST SERPL-CCNC: 24 UNIT/L (ref 5–34)
BILIRUB SERPL-MCNC: 0.3 MG/DL
BUN SERPL-MCNC: 10.2 MG/DL (ref 9.8–20.1)
CALCIUM SERPL-MCNC: 9.6 MG/DL (ref 8.4–10.2)
CHLORIDE SERPL-SCNC: 104 MMOL/L (ref 98–107)
CHOLEST SERPL-MCNC: 167 MG/DL
CHOLEST/HDLC SERPL: 2 {RATIO} (ref 0–5)
CO2 SERPL-SCNC: 33 MMOL/L (ref 23–31)
CREAT SERPL-MCNC: 0.77 MG/DL (ref 0.55–1.02)
DEPRECATED CALCIDIOL+CALCIFEROL SERPL-MC: 74.9 NG/ML (ref 30–80)
ERYTHROCYTE [DISTWIDTH] IN BLOOD BY AUTOMATED COUNT: 14.4 % (ref 11.5–17)
EST. AVERAGE GLUCOSE BLD GHB EST-MCNC: 125.5 MG/DL
FT4I SERPL CALC-MCNC: 3.35 (ref 2.6–3.6)
GFR SERPLBLD CREATININE-BSD FMLA CKD-EPI: >60 MLS/MIN/1.73/M2
GLOBULIN SER-MCNC: 3.7 GM/DL (ref 2.4–3.5)
GLUCOSE SERPL-MCNC: 110 MG/DL (ref 82–115)
HBA1C MFR BLD: 6 %
HCT VFR BLD AUTO: 44.2 % (ref 37–47)
HDLC SERPL-MCNC: 68 MG/DL (ref 35–60)
HGB BLD-MCNC: 13.8 G/DL (ref 12–16)
LDLC SERPL CALC-MCNC: 87 MG/DL (ref 50–140)
MAGNESIUM SERPL-MCNC: 2.3 MG/DL (ref 1.6–2.6)
MCH RBC QN AUTO: 29.1 PG (ref 27–31)
MCHC RBC AUTO-ENTMCNC: 31.2 G/DL (ref 33–36)
MCV RBC AUTO: 93.1 FL (ref 80–94)
PLATELET # BLD AUTO: 326 X10(3)/MCL (ref 130–400)
PMV BLD AUTO: 9.5 FL (ref 7.4–10.4)
POTASSIUM SERPL-SCNC: 4.9 MMOL/L (ref 3.5–5.1)
PROT SERPL-MCNC: 7.2 GM/DL (ref 5.8–7.6)
RBC # BLD AUTO: 4.75 X10(6)/MCL (ref 4.2–5.4)
SODIUM SERPL-SCNC: 145 MMOL/L (ref 136–145)
T3RU NFR SERPL: 32.94 % (ref 31–39)
T4 SERPL-MCNC: 10.17 UG/DL (ref 4.87–11.72)
TRIGL SERPL-MCNC: 62 MG/DL (ref 37–140)
TSH SERPL-ACNC: 0.77 UIU/ML (ref 0.35–4.94)
VLDLC SERPL CALC-MCNC: 12 MG/DL
WBC # SPEC AUTO: 6.62 X10(3)/MCL (ref 4.5–11.5)

## 2023-11-02 PROCEDURE — 84443 ASSAY THYROID STIM HORMONE: CPT

## 2023-11-02 PROCEDURE — 80061 LIPID PANEL: CPT

## 2023-11-02 PROCEDURE — 80053 COMPREHEN METABOLIC PANEL: CPT

## 2023-11-02 PROCEDURE — 82306 VITAMIN D 25 HYDROXY: CPT

## 2023-11-02 PROCEDURE — 83735 ASSAY OF MAGNESIUM: CPT

## 2023-11-02 PROCEDURE — 83036 HEMOGLOBIN GLYCOSYLATED A1C: CPT

## 2023-11-02 PROCEDURE — 36415 COLL VENOUS BLD VENIPUNCTURE: CPT

## 2023-11-02 PROCEDURE — 85027 COMPLETE CBC AUTOMATED: CPT

## 2023-11-02 PROCEDURE — 84436 ASSAY OF TOTAL THYROXINE: CPT

## 2023-11-27 ENCOUNTER — HOSPITAL ENCOUNTER (OUTPATIENT)
Dept: RADIOLOGY | Facility: HOSPITAL | Age: 73
Discharge: HOME | End: 2023-11-27
Attending: INTERNAL MEDICINE
Payer: MEDICARE

## 2023-11-27 DIAGNOSIS — Z12.31 VISIT FOR SCREENING MAMMOGRAM: ICD-10-CM

## 2023-11-27 PROCEDURE — 77067 SCR MAMMO BI INCL CAD: CPT | Mod: TC

## 2023-11-27 PROCEDURE — 77067 MAMMO DIGITAL SCREENING BILAT WITH TOMO: ICD-10-PCS | Mod: 26,,, | Performed by: STUDENT IN AN ORGANIZED HEALTH CARE EDUCATION/TRAINING PROGRAM

## 2023-11-27 PROCEDURE — 77063 MAMMO DIGITAL SCREENING BILAT WITH TOMO: ICD-10-PCS | Mod: 26,,, | Performed by: STUDENT IN AN ORGANIZED HEALTH CARE EDUCATION/TRAINING PROGRAM

## 2023-11-27 PROCEDURE — 77063 BREAST TOMOSYNTHESIS BI: CPT | Mod: 26,,, | Performed by: STUDENT IN AN ORGANIZED HEALTH CARE EDUCATION/TRAINING PROGRAM

## 2023-11-27 PROCEDURE — 77067 SCR MAMMO BI INCL CAD: CPT | Mod: 26,,, | Performed by: STUDENT IN AN ORGANIZED HEALTH CARE EDUCATION/TRAINING PROGRAM

## 2024-02-12 ENCOUNTER — HOSPITAL ENCOUNTER (OUTPATIENT)
Dept: RADIOLOGY | Facility: CLINIC | Age: 74
Discharge: HOME | End: 2024-02-12
Attending: ORTHOPAEDIC SURGERY
Payer: MEDICARE

## 2024-02-12 ENCOUNTER — OFFICE VISIT (OUTPATIENT)
Dept: ORTHOPEDICS | Facility: CLINIC | Age: 74
End: 2024-02-12
Payer: MEDICARE

## 2024-02-12 VITALS
HEART RATE: 76 BPM | WEIGHT: 159.38 LBS | HEIGHT: 60 IN | SYSTOLIC BLOOD PRESSURE: 106 MMHG | DIASTOLIC BLOOD PRESSURE: 66 MMHG | BODY MASS INDEX: 31.29 KG/M2

## 2024-02-12 DIAGNOSIS — M12.811 ROTATOR CUFF ARTHROPATHY OF RIGHT SHOULDER: Primary | ICD-10-CM

## 2024-02-12 DIAGNOSIS — M12.811 ROTATOR CUFF ARTHROPATHY OF RIGHT SHOULDER: ICD-10-CM

## 2024-02-12 PROCEDURE — 99213 OFFICE O/P EST LOW 20 MIN: CPT | Mod: ,,, | Performed by: ORTHOPAEDIC SURGERY

## 2024-02-12 NOTE — PROGRESS NOTES
Chief Complaint:   Chief Complaint   Patient presents with    Right Shoulder - Pain, Post-op Evaluation     1 year sp right RTSA sx 2/7/23. Follow-up. Denies pain. Can't go back too far with it but Full ROM.        Consulting Physician: No ref. provider found    History of present illness:  2/7/23: Right Reverse total shoulder arthroplasty     She returns today. Her pain is under good control. Her main complaint is difficulty reaching behind her.  She is better than where she was before surgery.    Past Medical History:   Diagnosis Date    HLD (hyperlipidemia)     Osteoarthritis     Thyroid disease        Past Surgical History:   Procedure Laterality Date    ADENOIDECTOMY      ANTERIOR CERVICAL DISCECTOMY W/ FUSION      APPENDECTOMY      BACK SURGERY      fusion    BREAST SURGERY      BUNIONECTOMY Bilateral     BUNIONECTOMY Left     with foot reconstruction    BUNIONECTOMY Right     CARPAL TUNNEL RELEASE Bilateral     CATARACT EXTRACTION W/  INTRAOCULAR LENS IMPLANT Bilateral     DILATION AND CURETTAGE OF UTERUS      EYE SURGERY      HYSTERECTOMY      JOINT REPLACEMENT Left     total knee    LAPAROSCOPIC REPAIR OF HIATAL HERNIA      REDUCTION OF BOTH BREASTS      with abdominoplasty    REVERSE TOTAL SHOULDER ARTHROPLASTY Right 02/07/2023    Procedure: ARTHROPLASTY, SHOULDER, TOTAL, REVERSE - FX;  Surgeon: Ron Apple Jr., MD;  Location: [facility];  Service: Orthopedics;  Laterality: Right;    SHOULDER ARTHROSCOPY W/ ROTATOR CUFF REPAIR Bilateral     SPINE SURGERY      TONSILLECTOMY      adenoidectomy    TOTAL KNEE ARTHROPLASTY Right        Current Outpatient Medications   Medication Sig    ascorbic acid, vitamin C, (VITAMIN C) 500 MG tablet Take 500 mg by mouth nightly.    balsalazide (COLAZAL) 750 mg capsule Take 1,500 mg by mouth 3 (three) times daily.    busPIRone (BUSPAR) 15 MG tablet Take 15 mg by mouth once daily.    butalb/acetaminophen/caffeine (FIORICET ORAL) Take 1 tablet by mouth as needed.    calcium  carbonate (OS-DEISY) 500 mg calcium (1,250 mg) chewable tablet Take 1 tablet by mouth 2 (two) times daily.    cetirizine (ZYRTEC) 10 MG tablet Take 10 mg by mouth once daily.    cholecalciferol, vitamin D3, 125 mcg (5,000 unit) Tab Take 5,000 Units by mouth once daily.    cyanocobalamin 2000 MCG tablet Take 2,500 mcg by mouth.    doxepin (SINEQUAN) 100 MG capsule Take 200 mg by mouth every evening.    estradioL (ESTRACE) 1 MG tablet Take 1 mg by mouth once daily.    fluticasone propionate (FLONASE) 50 mcg/actuation nasal spray 1 spray by Nasal route as needed.    hyoscyamine (LEVSIN/SL) 0.125 mg Subl Take 0.125 mg by mouth daily as needed.    KRILL OIL ORAL Take 1 capsule by mouth every evening.    levothyroxine (SYNTHROID) 75 MCG tablet Take 75 mcg by mouth before breakfast.    meclizine (ANTIVERT) 25 MG tablet Take 25 mg by mouth 3 (three) times daily as needed.    naproxen (NAPROSYN) 500 MG tablet Take 500 mg by mouth once daily.    NON FORMULARY MEDICATION Take 2 tablets by mouth Daily. Flojen probiotic    pantoprazole (PROTONIX) 40 MG tablet Take 40 mg by mouth 2 (two) times daily.    POTASSIUM CHLORIDE ORAL Take by mouth nightly.    simvastatin (ZOCOR) 40 MG tablet Take 40 mg by mouth every evening.    sucralfate (CARAFATE) 1 gram tablet Take 1 g by mouth 2 (two) times daily.    triamterene/hydrochlorothiazid (MAXZIDE-25MG ORAL) Take 0.5 tablets by mouth Daily.    venlafaxine (EFFEXOR-XR) 150 MG Cp24 Take 150 mg by mouth once daily.    vit B12/levomefolate/vit B6/B2 (CEREFOLIN ORAL) Take 1 tablet by mouth Daily.    vitamin E 400 UNIT capsule Take 400 Units by mouth nightly.     Current Facility-Administered Medications   Medication    albuterol nebulizer solution 2.5 mg       Review of patient's allergies indicates:   Allergen Reactions    Amoxicillin-pot clavulanate Diarrhea and Other (See Comments)     Cause erosion, severe bleeding       Codeine Itching    Diphenhydramine hcl Other (See Comments)     Other  "reaction(s): HYPERACTIVE  Hyperactivity, restless      Hydrocodone-acetaminophen Itching     mild       Family History   Problem Relation Age of Onset    Arthritis Mother     Depression Mother     Hypertension Mother     Cancer Father        Social History     Socioeconomic History    Marital status: Significant Other   Tobacco Use    Smoking status: Never    Smokeless tobacco: Never   Substance and Sexual Activity    Alcohol use: Not Currently    Drug use: Never    Sexual activity: Not Currently     Partners: Female, Male       Review of Systems:    Constitution:   Denies chills, fever, and sweats.  HENT:   Denies headaches or blurry vision.  Cardiovascular:  Denies chest pain or irregular heart beat.  Respiratory:   Denies cough or shortness of breath.  Gastrointestinal:  Denies abdominal pain, nausea, or vomiting.  Musculoskeletal:   Denies muscle cramps.  Neurological:   Denies dizziness or focal weakness.  Psychiatric/Behavior: Normal mental status.  Hematology/Lymph:  Denies bleeding problem or easy bruising/bleeding.  Skin:    Denies rash or suspicious lesions.    Examination:    Vital Signs:    Vitals:    02/12/24 1356   BP: 106/66   Pulse: 76   Weight: 72.3 kg (159 lb 6.4 oz)   Height: 5' 0.43" (1.535 m)       Body mass index is 30.69 kg/m².    Constitution:   Well-developed, well nourished patient in no acute distress.  Neurological:   Alert and oriented x 3 and cooperative to examination.     Psychiatric/Behavior: Normal mental status.  Respiratory:   No shortness of breath.  Cards:    Pulses palpable and symmetric, brisk cap refill   Eyes:    Extraoccular muscles intact  Skin:    No scars, rash or suspicious lesions.    MSK:   Shoulder Exam:                   Right        Left  Skin:                                   Normal     Normal  AC joint tenderness:           None         None  Forward Flexion:                180            180  Abduction:                          180           180  External " Rotation:               80              80  Internal Rotation:                40             80  Supraspinatus stress test: Neg           Neg  Hawkin's Impingement:     Neg           Neg  Neer Impingement:            Neg           Neg  Apprehension:                   Neg           Neg  [place]'s:                           Neg           Neg  Speed's test:                     Neg            Neg  Strength:  External Rotation:           5/5                5/5  Lift Off/belly press:          5/5                5/5    N-V status:                   Intact             Intact    C-spine: Normal ROM, NT      Imaging: X-rays ordered and images interpreted today personally by me of 4 views of the right shoulder show appropriate implant position.       Assessment: Rotator cuff arthropathy of right shoulder  -     X-ray Shoulder 2 or More Views Right; Future; Expected date: 02/12/2024        Plan:  Doing well s/p above. Activities as tolerated. Follow up at the 2 year frank with repeat xrays of the right shoulder.

## 2024-02-14 ENCOUNTER — LAB VISIT (OUTPATIENT)
Dept: LAB | Facility: HOSPITAL | Age: 74
End: 2024-02-14
Attending: INTERNAL MEDICINE
Payer: MEDICARE

## 2024-02-14 ENCOUNTER — HOSPITAL ENCOUNTER (OUTPATIENT)
Dept: RADIOLOGY | Facility: CLINIC | Age: 74
Discharge: HOME | End: 2024-02-14
Attending: ORTHOPAEDIC SURGERY
Payer: MEDICARE

## 2024-02-14 DIAGNOSIS — E55.9 AVITAMINOSIS D: ICD-10-CM

## 2024-02-14 DIAGNOSIS — R73.9 BLOOD GLUCOSE ELEVATED: Primary | ICD-10-CM

## 2024-02-14 LAB
DEPRECATED CALCIDIOL+CALCIFEROL SERPL-MC: 57.5 NG/ML (ref 30–80)
EST. AVERAGE GLUCOSE BLD GHB EST-MCNC: 119.8 MG/DL
HBA1C MFR BLD: 5.8 %

## 2024-02-14 PROCEDURE — 82306 VITAMIN D 25 HYDROXY: CPT

## 2024-02-14 PROCEDURE — 73030 X-RAY EXAM OF SHOULDER: CPT | Mod: RT,,, | Performed by: ORTHOPAEDIC SURGERY

## 2024-02-14 PROCEDURE — 36415 COLL VENOUS BLD VENIPUNCTURE: CPT

## 2024-02-14 PROCEDURE — 83036 HEMOGLOBIN GLYCOSYLATED A1C: CPT

## 2024-08-26 ENCOUNTER — HOSPITAL ENCOUNTER (OUTPATIENT)
Dept: RADIOLOGY | Facility: HOSPITAL | Age: 74
Discharge: HOME | End: 2024-08-26
Attending: OTOLARYNGOLOGY
Payer: MEDICARE

## 2024-08-26 DIAGNOSIS — R05.3 CHRONIC COUGH: Primary | ICD-10-CM

## 2024-08-26 DIAGNOSIS — R05.3 CHRONIC COUGH: ICD-10-CM

## 2024-08-26 PROCEDURE — 71046 X-RAY EXAM CHEST 2 VIEWS: CPT | Mod: TC

## 2024-09-16 ENCOUNTER — LAB VISIT (OUTPATIENT)
Dept: LAB | Facility: HOSPITAL | Age: 74
End: 2024-09-16
Attending: INTERNAL MEDICINE
Payer: MEDICARE

## 2024-09-16 DIAGNOSIS — R53.83 FATIGUE, UNSPECIFIED TYPE: Primary | ICD-10-CM

## 2024-09-16 DIAGNOSIS — Z00.00 ROUTINE GENERAL MEDICAL EXAMINATION AT A HEALTH CARE FACILITY: ICD-10-CM

## 2024-09-16 DIAGNOSIS — Z82.49 FAMILY HISTORY OF ISCHEMIC HEART DISEASE: ICD-10-CM

## 2024-09-16 DIAGNOSIS — Z79.899 ENCOUNTER FOR LONG-TERM (CURRENT) USE OF OTHER MEDICATIONS: ICD-10-CM

## 2024-09-16 DIAGNOSIS — E55.9 AVITAMINOSIS D: ICD-10-CM

## 2024-09-16 LAB
25(OH)D3+25(OH)D2 SERPL-MCNC: 80 NG/ML (ref 30–80)
ALBUMIN SERPL-MCNC: 3.7 G/DL (ref 3.4–4.8)
ALBUMIN/GLOB SERPL: 1.1 RATIO (ref 1.1–2)
ALP SERPL-CCNC: 63 UNIT/L (ref 40–150)
ALT SERPL-CCNC: 24 UNIT/L (ref 0–55)
ANION GAP SERPL CALC-SCNC: 2 MEQ/L
AST SERPL-CCNC: 24 UNIT/L (ref 5–34)
BASOPHILS # BLD AUTO: 0.07 X10(3)/MCL
BASOPHILS NFR BLD AUTO: 1.2 %
BILIRUB SERPL-MCNC: 0.3 MG/DL
BUN SERPL-MCNC: 11.3 MG/DL (ref 9.8–20.1)
CALCIUM SERPL-MCNC: 9.6 MG/DL (ref 8.4–10.2)
CHLORIDE SERPL-SCNC: 104 MMOL/L (ref 98–107)
CHOLEST SERPL-MCNC: 212 MG/DL
CHOLEST/HDLC SERPL: 3 {RATIO} (ref 0–5)
CO2 SERPL-SCNC: 36 MMOL/L (ref 23–31)
CREAT SERPL-MCNC: 0.81 MG/DL (ref 0.55–1.02)
CREAT/UREA NIT SERPL: 14
EOSINOPHIL # BLD AUTO: 0.38 X10(3)/MCL (ref 0–0.9)
EOSINOPHIL NFR BLD AUTO: 6.3 %
ERYTHROCYTE [DISTWIDTH] IN BLOOD BY AUTOMATED COUNT: 14.4 % (ref 11.5–17)
GFR SERPLBLD CREATININE-BSD FMLA CKD-EPI: >60 ML/MIN/1.73/M2
GLOBULIN SER-MCNC: 3.3 GM/DL (ref 2.4–3.5)
GLUCOSE SERPL-MCNC: 100 MG/DL (ref 82–115)
HCT VFR BLD AUTO: 43.1 % (ref 37–47)
HDLC SERPL-MCNC: 70 MG/DL (ref 35–60)
HGB BLD-MCNC: 14.2 G/DL (ref 12–16)
IMM GRANULOCYTES # BLD AUTO: 0.01 X10(3)/MCL (ref 0–0.04)
IMM GRANULOCYTES NFR BLD AUTO: 0.2 %
LDLC SERPL CALC-MCNC: 125 MG/DL (ref 50–140)
LYMPHOCYTES # BLD AUTO: 0.92 X10(3)/MCL (ref 0.6–4.6)
LYMPHOCYTES NFR BLD AUTO: 15.3 %
MAGNESIUM SERPL-MCNC: 2.1 MG/DL (ref 1.6–2.6)
MCH RBC QN AUTO: 31.3 PG (ref 27–31)
MCHC RBC AUTO-ENTMCNC: 32.9 G/DL (ref 33–36)
MCV RBC AUTO: 95.1 FL (ref 80–94)
MONOCYTES # BLD AUTO: 0.6 X10(3)/MCL (ref 0.1–1.3)
MONOCYTES NFR BLD AUTO: 10 %
NEUTROPHILS # BLD AUTO: 4.05 X10(3)/MCL (ref 2.1–9.2)
NEUTROPHILS NFR BLD AUTO: 67 %
PLATELET # BLD AUTO: 203 X10(3)/MCL (ref 130–400)
PMV BLD AUTO: 10.4 FL (ref 7.4–10.4)
POTASSIUM SERPL-SCNC: 4.1 MMOL/L (ref 3.5–5.1)
PROT SERPL-MCNC: 7 GM/DL (ref 5.8–7.6)
RBC # BLD AUTO: 4.53 X10(6)/MCL (ref 4.2–5.4)
SODIUM SERPL-SCNC: 142 MMOL/L (ref 136–145)
TRIGL SERPL-MCNC: 85 MG/DL (ref 37–140)
TSH SERPL-ACNC: 1.21 UIU/ML (ref 0.35–4.94)
VLDLC SERPL CALC-MCNC: 17 MG/DL
WBC # BLD AUTO: 6.03 X10(3)/MCL (ref 4.5–11.5)

## 2024-09-16 PROCEDURE — 85025 COMPLETE CBC W/AUTO DIFF WBC: CPT

## 2024-09-16 PROCEDURE — 80061 LIPID PANEL: CPT

## 2024-09-16 PROCEDURE — 82306 VITAMIN D 25 HYDROXY: CPT

## 2024-09-16 PROCEDURE — 83735 ASSAY OF MAGNESIUM: CPT

## 2024-09-16 PROCEDURE — 84443 ASSAY THYROID STIM HORMONE: CPT

## 2024-09-16 PROCEDURE — 84479 ASSAY OF THYROID (T3 OR T4): CPT

## 2024-09-16 PROCEDURE — 80053 COMPREHEN METABOLIC PANEL: CPT

## 2024-09-16 PROCEDURE — 36415 COLL VENOUS BLD VENIPUNCTURE: CPT

## 2024-09-16 PROCEDURE — 84436 ASSAY OF TOTAL THYROXINE: CPT

## 2024-09-18 LAB
FT4I SERPL CALC-MCNC: 7 MCG/DL (ref 4.8–12.7)
T4 SERPL IA-MCNC: 7.7 MCG/DL (ref 4.5–11.7)
TOTAL TBC SERPL: 1.1 TBI (ref 0.8–1.3)

## 2024-10-16 ENCOUNTER — LAB VISIT (OUTPATIENT)
Dept: LAB | Facility: HOSPITAL | Age: 74
End: 2024-10-16
Attending: INTERNAL MEDICINE
Payer: MEDICARE

## 2024-10-16 DIAGNOSIS — E16.2 HYPOGLYCEMIA, UNSPECIFIED: Primary | ICD-10-CM

## 2024-10-16 LAB
EST. AVERAGE GLUCOSE BLD GHB EST-MCNC: 119.8 MG/DL
GLUCOSE 1H P 100 G GLC PO SERPL-MCNC: 97 MG/DL (ref 100–180)
GLUCOSE 2H P 100 G GLC PO SERPL-MCNC: 99 MG/DL (ref 70–140)
GLUCOSE P FAST SERPL-MCNC: 90 MG/DL (ref 70–100)
HBA1C MFR BLD: 5.8 %

## 2024-10-16 PROCEDURE — 82950 GLUCOSE TEST: CPT

## 2024-10-16 PROCEDURE — 82947 ASSAY GLUCOSE BLOOD QUANT: CPT

## 2024-10-16 PROCEDURE — 83036 HEMOGLOBIN GLYCOSYLATED A1C: CPT

## 2024-10-16 PROCEDURE — 36415 COLL VENOUS BLD VENIPUNCTURE: CPT

## 2024-10-16 PROCEDURE — 82951 GLUCOSE TOLERANCE TEST (GTT): CPT

## 2024-10-23 DIAGNOSIS — Z12.31 BREAST CANCER SCREENING BY MAMMOGRAM: Primary | ICD-10-CM

## 2024-12-04 ENCOUNTER — HOSPITAL ENCOUNTER (OUTPATIENT)
Dept: RADIOLOGY | Facility: HOSPITAL | Age: 74
Discharge: HOME | End: 2024-12-04
Attending: INTERNAL MEDICINE
Payer: MEDICARE

## 2024-12-04 DIAGNOSIS — Z12.31 BREAST CANCER SCREENING BY MAMMOGRAM: ICD-10-CM

## 2024-12-04 PROCEDURE — 77067 SCR MAMMO BI INCL CAD: CPT | Mod: 26,,, | Performed by: RADIOLOGY

## 2024-12-04 PROCEDURE — 77063 BREAST TOMOSYNTHESIS BI: CPT | Mod: TC

## 2024-12-04 PROCEDURE — 77063 BREAST TOMOSYNTHESIS BI: CPT | Mod: 26,,, | Performed by: RADIOLOGY

## 2025-02-12 ENCOUNTER — HOSPITAL ENCOUNTER (OUTPATIENT)
Dept: RADIOLOGY | Facility: CLINIC | Age: 75
Discharge: HOME | End: 2025-02-12
Attending: ORTHOPAEDIC SURGERY
Payer: MEDICARE

## 2025-02-12 ENCOUNTER — OFFICE VISIT (OUTPATIENT)
Dept: ORTHOPEDICS | Facility: CLINIC | Age: 75
End: 2025-02-12
Payer: MEDICARE

## 2025-02-12 VITALS
WEIGHT: 149.38 LBS | BODY MASS INDEX: 29.33 KG/M2 | HEIGHT: 60 IN | DIASTOLIC BLOOD PRESSURE: 74 MMHG | SYSTOLIC BLOOD PRESSURE: 114 MMHG | HEART RATE: 71 BPM

## 2025-02-12 DIAGNOSIS — Z96.611 S/P REVERSE TOTAL SHOULDER ARTHROPLASTY, RIGHT: ICD-10-CM

## 2025-02-12 DIAGNOSIS — Z96.611 S/P REVERSE TOTAL SHOULDER ARTHROPLASTY, RIGHT: Primary | ICD-10-CM

## 2025-02-12 PROCEDURE — 73030 X-RAY EXAM OF SHOULDER: CPT | Mod: RT,,, | Performed by: ORTHOPAEDIC SURGERY

## 2025-02-12 NOTE — PROGRESS NOTES
Chief Complaint:   Chief Complaint   Patient presents with    Right Shoulder - Follow-up     2 year f/u right RTSA sx 2/7/23, patient states that shoulder is doing fine but reports that she has right hand nerve issues and shaking and fine motor issues       Consulting Physician: No ref. provider found    History of present illness:  2/7/23: Right Reverse total shoulder arthroplasty     She returns today.  Overall she is doing quite well.  She does not have any complaints about her shoulder.  She is back to all her normal activities.  She has noticed some issues with fine motor skills as well as some numbness of the small finger.  She has had cervical spine surgery with Dr. Floyd a few years back    Past Medical History:   Diagnosis Date    HLD (hyperlipidemia)     Osteoarthritis     Thyroid disease        Past Surgical History:   Procedure Laterality Date    ADENOIDECTOMY      ANTERIOR CERVICAL DISCECTOMY W/ FUSION      APPENDECTOMY      BACK SURGERY      fusion    BREAST SURGERY      BUNIONECTOMY Bilateral     BUNIONECTOMY Left     with foot reconstruction    BUNIONECTOMY Right     CARPAL TUNNEL RELEASE Bilateral     CATARACT EXTRACTION W/  INTRAOCULAR LENS IMPLANT Bilateral     DILATION AND CURETTAGE OF UTERUS      EYE SURGERY      HYSTERECTOMY      JOINT REPLACEMENT Left     total knee    LAPAROSCOPIC REPAIR OF HIATAL HERNIA      REDUCTION OF BOTH BREASTS      with abdominoplasty    REVERSE TOTAL SHOULDER ARTHROPLASTY Right 02/07/2023    Procedure: ARTHROPLASTY, SHOULDER, TOTAL, REVERSE - FX;  Surgeon: Ron Apple Jr., MD;  Location: [facility];  Service: Orthopedics;  Laterality: Right;    SHOULDER ARTHROSCOPY W/ ROTATOR CUFF REPAIR Bilateral     SPINE SURGERY      TONSILLECTOMY      adenoidectomy    TOTAL KNEE ARTHROPLASTY Right        Current Outpatient Medications   Medication Sig    ascorbic acid, vitamin C, (VITAMIN C) 500 MG tablet Take 500 mg by mouth nightly.    balsalazide (COLAZAL) 750 mg capsule Take  1,500 mg by mouth 3 (three) times daily.    busPIRone (BUSPAR) 15 MG tablet Take 15 mg by mouth once daily.    butalb/acetaminophen/caffeine (FIORICET ORAL) Take 1 tablet by mouth as needed.    calcium carbonate (OS-DEISY) 500 mg calcium (1,250 mg) chewable tablet Take 1 tablet by mouth 2 (two) times daily.    cetirizine (ZYRTEC) 10 MG tablet Take 10 mg by mouth once daily.    cholecalciferol, vitamin D3, 125 mcg (5,000 unit) Tab Take 5,000 Units by mouth once daily.    cyanocobalamin 2000 MCG tablet Take 2,500 mcg by mouth.    doxepin (SINEQUAN) 100 MG capsule Take 200 mg by mouth every evening.    estradioL (ESTRACE) 1 MG tablet Take 1 mg by mouth once daily.    fluticasone propionate (FLONASE) 50 mcg/actuation nasal spray 1 spray by Nasal route as needed.    hyoscyamine (LEVSIN/SL) 0.125 mg Subl Take 0.125 mg by mouth daily as needed.    KRILL OIL ORAL Take 1 capsule by mouth every evening.    levothyroxine (SYNTHROID) 75 MCG tablet Take 75 mcg by mouth before breakfast.    meclizine (ANTIVERT) 25 MG tablet Take 25 mg by mouth 3 (three) times daily as needed.    naproxen (NAPROSYN) 500 MG tablet Take 500 mg by mouth once daily.    NON FORMULARY MEDICATION Take 2 tablets by mouth Daily. Flojen probiotic    pantoprazole (PROTONIX) 40 MG tablet Take 40 mg by mouth 2 (two) times daily.    POTASSIUM CHLORIDE ORAL Take by mouth nightly.    simvastatin (ZOCOR) 40 MG tablet Take 40 mg by mouth every evening.    sucralfate (CARAFATE) 1 gram tablet Take 1 g by mouth 2 (two) times daily.    triamterene/hydrochlorothiazid (MAXZIDE-25MG ORAL) Take 0.5 tablets by mouth Daily.    venlafaxine (EFFEXOR-XR) 150 MG Cp24 Take 150 mg by mouth once daily.    vit B12/levomefolate/vit B6/B2 (CEREFOLIN ORAL) Take 1 tablet by mouth Daily.    vitamin E 400 UNIT capsule Take 400 Units by mouth nightly.     Current Facility-Administered Medications   Medication    albuterol nebulizer solution 2.5 mg       Review of patient's allergies  "indicates:   Allergen Reactions    Amoxicillin-pot clavulanate Diarrhea and Other (See Comments)     Cause erosion, severe bleeding       Codeine Itching    Diphenhydramine hcl Other (See Comments)     Other reaction(s): HYPERACTIVE  Hyperactivity, restless      Hydrocodone-acetaminophen Itching     mild       Family History   Problem Relation Name Age of Onset    Arthritis Mother Denise Brito     Depression Mother Denise Brito     Hypertension Mother Denise Brito     Cancer Father Facundo Brito        Social History     Socioeconomic History    Marital status: Significant Other   Tobacco Use    Smoking status: Never    Smokeless tobacco: Never   Substance and Sexual Activity    Alcohol use: Not Currently    Drug use: Never    Sexual activity: Not Currently     Partners: Female, Male       Review of Systems:    Constitution:   Denies chills, fever, and sweats.  HENT:   Denies headaches or blurry vision.  Cardiovascular:  Denies chest pain or irregular heart beat.  Respiratory:   Denies cough or shortness of breath.  Gastrointestinal:  Denies abdominal pain, nausea, or vomiting.  Musculoskeletal:   Denies muscle cramps.  Neurological:   Denies dizziness or focal weakness.  Psychiatric/Behavior: Normal mental status.  Hematology/Lymph:  Denies bleeding problem or easy bruising/bleeding.  Skin:    Denies rash or suspicious lesions.    Examination:    Vital Signs:    Vitals:    02/12/25 1254   BP: 114/74   Pulse: 71   Weight: 67.8 kg (149 lb 6.4 oz)   Height: 5' 0.43" (1.535 m)   PainSc: 0-No pain   PainLoc: Shoulder       Body mass index is 28.76 kg/m².    Constitution:   Well-developed, well nourished patient in no acute distress.  Neurological:   Alert and oriented x 3 and cooperative to examination.     Psychiatric/Behavior: Normal mental status.  Respiratory:   No shortness of breath.  Cards:    Pulses palpable and symmetric, brisk cap refill   Eyes:    Extraoccular muscles intact  Skin:    No " scars, rash or suspicious lesions.    MSK:   Shoulder Exam:                   Right        Left  Skin:                                   Normal     Normal  AC joint tenderness:           None         None  Forward Flexion:                180            180  Abduction:                          180           180  External Rotation:               80              80  Internal Rotation:                60             80  Supraspinatus stress test: Neg           Neg  Hawkin's Impingement:     Neg           Neg  Neer Impingement:            Neg           Neg  Apprehension:                   Neg           Neg  [place]'s:                           Neg           Neg  Speed's test:                     Neg            Neg  Strength:  External Rotation:           5/5                5/5  Lift Off/belly press:          5/5                5/5    N-V status:                   Intact             Intact    C-spine: Normal ROM, NT      Imaging: X-rays ordered and images interpreted today personally by me of 4 views of the right shoulder show appropriate implant position.       Assessment: S/P reverse total shoulder arthroplasty, right  -     X-ray Shoulder 2 or More Views Right; Future; Expected date: 02/12/2025        Plan:  Doing well s/p above. Activities as tolerated.  I will see her back as needed.  If her numbness or tingling persists or worsens we could consider nerve conduction test.

## 2025-04-16 ENCOUNTER — HOSPITAL ENCOUNTER (EMERGENCY)
Facility: HOSPITAL | Age: 75
Discharge: HOME | End: 2025-04-16
Attending: EMERGENCY MEDICINE
Payer: MEDICARE

## 2025-04-16 VITALS
RESPIRATION RATE: 18 BRPM | HEART RATE: 70 BPM | TEMPERATURE: 98 F | OXYGEN SATURATION: 95 % | BODY MASS INDEX: 29.03 KG/M2 | WEIGHT: 144 LBS | DIASTOLIC BLOOD PRESSURE: 96 MMHG | SYSTOLIC BLOOD PRESSURE: 122 MMHG | HEIGHT: 59 IN
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
## 2025-04-16 DIAGNOSIS — S00.03XA CONTUSION OF SCALP, INITIAL ENCOUNTER: ICD-10-CM

## 2025-04-16 DIAGNOSIS — S05.12XA PERIORBITAL CONTUSION OF LEFT EYE, INITIAL ENCOUNTER: ICD-10-CM

## 2025-04-16 DIAGNOSIS — S63.259A CLOSED DISLOCATION OF FINGER OF LEFT HAND, INITIAL ENCOUNTER: ICD-10-CM

## 2025-04-16 DIAGNOSIS — S00.532A CONTUSION OF ORAL CAVITY, INITIAL ENCOUNTER: ICD-10-CM

## 2025-04-16 DIAGNOSIS — R04.0 EPISTAXIS: ICD-10-CM

## 2025-04-16 PROCEDURE — 99285 EMERGENCY DEPT VISIT HI MDM: CPT | Mod: 25

## 2025-04-16 PROCEDURE — 26770 TREAT FINGER DISLOCATION: CPT | Mod: F3

## 2025-04-16 PROCEDURE — 63600175 PHARM REV CODE 636 W HCPCS: Performed by: EMERGENCY MEDICINE

## 2025-04-16 RX ORDER — CHLORHEXIDINE GLUCONATE ORAL RINSE 1.2 MG/ML
15 SOLUTION DENTAL 2 TIMES DAILY
Qty: 300 ML | Refills: 0 | Status: SHIPPED | OUTPATIENT
Start: 2025-04-16 | End: 2025-04-30

## 2025-04-16 RX ORDER — LIDOCAINE HYDROCHLORIDE 10 MG/ML
5 INJECTION, SOLUTION EPIDURAL; INFILTRATION; INTRACAUDAL; PERINEURAL
Status: COMPLETED | OUTPATIENT
Start: 2025-04-16 | End: 2025-04-16

## 2025-04-16 RX ADMIN — LIDOCAINE HYDROCHLORIDE 20 MG: 10 INJECTION, SOLUTION EPIDURAL; INFILTRATION; INTRACAUDAL; PERINEURAL at 03:04

## 2025-04-16 NOTE — ED NOTES
Pt w  ecchymosis  under lt eye  after trip fall  athome just pta- denies loc- also has  deformity to  lt 4 th finger.

## 2025-04-16 NOTE — ED NOTES
Lt 4 th finger reduced per dr garrett after local /block- pt tolerated well - ring removed now and finger placed in  aluminum foam finger splint- pt to  ct now.

## 2025-04-16 NOTE — ED PROVIDER NOTES
Encounter Date: 4/16/2025       History     Chief Complaint   Patient presents with    Fall     Pt presents per EMS s/p fall; pt states she tripped over her cat and fell forward hitting left side of her face on the corner of her bed; initially had nasal bleeding, stopped upon EMS arrival; pt c/o pain to left sinus/nasal area; no LOC, no BT; also deformity to left 4th digit      Chief Complaint  Fall with injury to face and hand, facial bleeding, finger deformity    History of Present Illness  Omaira Brito, a patient with a history of osteoarthritis, Crohn's disease (in remission), and hypertension, presents to the Emergency Department after falling over her cat. The patient reports injuries to her head, face, and left hand.    The patient states she tripped over her cat while hurrying to help her partner look for a mother cat with kittens under a shed. She was not looking down, as the cat was unexpectedly out during the daytime. The fall resulted in multiple injuries. She experienced bleeding from her nose, which has since stopped. She also sustained a cut on her head, though she is unsure if she directly hit her head during the fall. The patient reports bruising to her lip and a contusion to her dentures. Additionally, she notes an injury to her left hand, specifically the ring finger.    The patient denies any loss of consciousness or syncopal episode associated with the fall. She reports some neck stiffness but denies significant neck pain. The patient lives with a partner who is able to assist her. Her medical history is significant for a total right shoulder replacement two years ago, as well as previous surgeries on her left shoulder, cheeks, carpal tunnel, back, and nose.        Review of patient's allergies indicates:   Allergen Reactions    Amoxicillin-pot clavulanate Diarrhea and Other (See Comments)     Cause erosion, severe bleeding       Codeine Itching    Diphenhydramine hcl Other (See Comments)      Other reaction(s): HYPERACTIVE  Hyperactivity, restless      Hydrocodone-acetaminophen Itching     mild     Past Medical History:   Diagnosis Date    HLD (hyperlipidemia)     Osteoarthritis     Thyroid disease      Past Surgical History:   Procedure Laterality Date    ADENOIDECTOMY      ANTERIOR CERVICAL DISCECTOMY W/ FUSION      APPENDECTOMY      BACK SURGERY      fusion    BREAST SURGERY      BUNIONECTOMY Bilateral     BUNIONECTOMY Left     with foot reconstruction    BUNIONECTOMY Right     CARPAL TUNNEL RELEASE Bilateral     CATARACT EXTRACTION W/  INTRAOCULAR LENS IMPLANT Bilateral     DILATION AND CURETTAGE OF UTERUS      EYE SURGERY      HYSTERECTOMY      JOINT REPLACEMENT Left     total knee    LAPAROSCOPIC REPAIR OF HIATAL HERNIA      REDUCTION OF BOTH BREASTS      with abdominoplasty    REVERSE TOTAL SHOULDER ARTHROPLASTY Right 02/07/2023    Procedure: ARTHROPLASTY, SHOULDER, TOTAL, REVERSE - FX;  Surgeon: Ron Apple Jr., MD;  Location: [facility];  Service: Orthopedics;  Laterality: Right;    SHOULDER ARTHROSCOPY W/ ROTATOR CUFF REPAIR Bilateral     SPINE SURGERY      TONSILLECTOMY      adenoidectomy    TOTAL KNEE ARTHROPLASTY Right      Family History   Problem Relation Name Age of Onset    Arthritis Mother Denise Brito     Depression Mother Denise Brito     Hypertension Mother Denise Brito     Cancer Father Facundo Brito      Social History[1]  Review of Systems   Constitutional:  Negative for chills and fever.   HENT: Negative.  Negative for congestion, sore throat and trouble swallowing.    Eyes:  Negative for discharge and visual disturbance.   Respiratory:  Negative for cough and shortness of breath.    Cardiovascular:  Negative for chest pain and palpitations.   Gastrointestinal:  Negative for abdominal pain, diarrhea and vomiting.   Endocrine: Negative.    Genitourinary:  Negative for flank pain and hematuria.   Musculoskeletal:  Negative for myalgias.   Skin:  Negative for  rash.   Neurological:  Negative for dizziness and headaches.   Psychiatric/Behavioral:  Negative for hallucinations and suicidal ideas.    All other systems reviewed and are negative.      Physical Exam     Initial Vitals [04/16/25 1455]   BP Pulse Resp Temp SpO2   (!) 122/96 70 18 98 °F (36.7 °C) 95 %      MAP       --         Physical Exam    Nursing note and vitals reviewed.      Physical Examination  HEENT: Contusion on dentures noted. Bruising on lip observed. Nose was bleeding but has stopped. No obvious deformity or tenderness of nasal bones on palpation. Visual acuity intact.  Musculoskeletal: Obvious deformity of left ring finger with ulnar displacement at the interphalangeal joint. Pain elicited with manipulation of the affected finger.  Neurological: Alert and oriented. Able to follow commands and communicate clearly.    ED Course   Orthopedic Injury    Date/Time: 4/16/2025 3:52 PM    Performed by: Leobardo Pathak MD  Authorized by: Leobardo Pathak MD    Location procedure was performed:  [facility] EMERGENCY DEPARTMENT  Injury:     Injury location:  Finger    Location details:  Left ring finger    Injury type:  Dislocation    Dislocation type: PIP        Pre-procedure assessment:     Neurovascular status: Neurovascularly intact      Local anesthesia used?: Yes      Anesthesia:  Digital block    Local anesthetic:  Lidocaine 1% without epinephrine      Selections made in this section will also lock the Injury type section above.:     Manipulation performed?: Yes      Reduction successful?: Yes      Immobilization:  Splint    Labs Reviewed - No data to display       Imaging Results              CT Head Without Contrast (Final result)  Result time 04/16/25 16:12:58      Final result by Felicia Cunningham MD (04/16/25 16:12:58)                   Impression:      No acute intracranial abnormality.      Electronically signed by: Felicia Cunningham  Date:    04/16/2025  Time:    16:12               Narrative:     EXAMINATION:  CT HEAD WITHOUT CONTRAST    CLINICAL HISTORY:  Facial trauma, blunt;    TECHNIQUE:  Axial scans were obtained from skull base to the vertex.    Coronal and sagittal reconstructions obtained from the axial data.    Automatic exposure control was utilized to limit radiation dose.    Contrast: None    Radiation Dose:    Total DLP: 1941 mGy*cm    COMPARISON:  None    FINDINGS:  There is no acute intracranial hemorrhage or edema. The gray-white matter differentiation is preserved.    There is no mass effect or midline shift. The ventricles and sulci are normal in size. The basal cisterns are patent. There is no abnormal extra-axial fluid collection.    The calvarium and skull base are intact. The visualized paranasal sinuses and the mastoid air cells are clear.                                       CT Maxillofacial Without Contrast (Final result)  Result time 04/16/25 16:09:27      Final result by Denzel Magallanes MD (04/16/25 16:09:27)                   Impression:      No acute maxillofacial fracture identified.      Electronically signed by: Denzel Magallanes  Date:    04/16/2025  Time:    16:09               Narrative:    EXAMINATION:  CT MAXILLOFACIAL WITHOUT CONTRAST    CLINICAL HISTORY:  Facial trauma, blunt;    TECHNIQUE:  Multidetector axial images were performed maxillofacial without contrast and images reformatted.    Dose length product of 466 mGycm. Automated exposure control was utilized to minimize radiation dose.    COMPARISON:  CT sinuses July 16, 2020    FINDINGS:  There are no fractures of the orbital walls. The globes are unremarkable and no intra-orbital inflammations or emphysema identified. There are no fractures of the nasal bones, pterygoids, zygomatic arches, paranasal sinuses walls or the mandibles.    There are bilateral medial maxillary antrostomies, turbinectomies and partial ethmoidectomies.  There also maxillary operative changes.                                       X-Ray Finger  2 or More Views Left (Final result)  Result time 04/16/25 16:00:35      Final result by Felicia Cunningham MD (04/16/25 16:00:35)                   Impression:      Location of the ring finger proximal IP joint.      Electronically signed by: Felicia Cunningham  Date:    04/16/2025  Time:    16:00               Narrative:    EXAMINATION:  XR FINGER 2 OR MORE VIEWS LEFT    CLINICAL HISTORY:  fall;    COMPARISON:  None.    FINDINGS:  There is a dislocation of the ring finger proximal interphalangeal joint.  There is no definite fracture identified                                       Medications   LIDOcaine (PF) 10 mg/ml (1%) injection 50 mg (20 mg Infiltration Given by Provider 4/16/25 1848)     Medical Decision Making  Medical Decision Making  Omaira Brito, an elderly female with a history of osteoarthritis, Crohn's disease (in remission), and hypertension, presents after a fall over her cat, resulting in facial trauma, head injury, and left ring finger dislocation. The patient's presentation with facial bleeding, potential head injury, and obvious deformity of the left ring finger necessitates a thorough evaluation. Given the mechanism of injury and age over 66, a CT scan of the maxillofacial region and head is warranted to rule out facial fractures and intracranial hemorrhage, despite the absence of obvious neurological deficits. The left ring finger shows ulnar displacement at the interphalangeal joint, consistent with a dislocation of the middle phalanx. A digital block with 1% lidocaine is planned for manual reduction of the dislocation. The patient's denial of loss of consciousness and the absence of reported dizziness make a syncopal episode less likely as the cause of the fall. However, careful monitoring is necessary due to the patient's age and the potential for delayed presentation of intracranial injuries.    Left ring finger dislocation  Assessment: Patient presents with an obvious deformity to  the ring finger on the left hand, with ulnar displacement at the interphalangeal joint. This injury occurred during a fall when the patient tripped over a cat. X-ray imaging was performed to confirm the diagnosis. The dislocation involves the middle phalanx of the 4th digit on the left hand.  Plan:  - Perform digital block with 1% lidocaine using a 25-gauge needle  - Manual reduction of the middle phalanx dislocation  - Apply splint post-reduction    Facial trauma  Assessment: Patient reports falling and hitting her face, resulting in a nosebleed that has since stopped. There is visible bruising on the lip and a contusion on the dentures. The patient is unsure if she hit her head during the fall. Given the mechanism of injury and the patient's age (over 66), there is concern for potential facial bone fractures or intracranial injury.  Plan:  - Order CT scan of maxillofacial region to evaluate:  - Include head CT to rule out intracranial injury      Amount and/or Complexity of Data Reviewed  Radiology: ordered.    Risk  Prescription drug management.                                      Clinical Impression:  Final diagnoses:  [W19.XXXA] Fall, initial encounter (Primary)  [S63.259A] Closed dislocation of finger of left hand, initial encounter  [S05.12XA] Periorbital contusion of left eye, initial encounter  [R04.0] Epistaxis  [S00.532A] Contusion of oral cavity, initial encounter  [S00.03XA] Contusion of scalp, initial encounter          ED Disposition Condition    Discharge Good          ED Prescriptions       Medication Sig Dispense Start Date End Date Auth. Provider    chlorhexidine (PERIDEX) 0.12 % solution Use as directed 15 mLs in the mouth or throat 2 (two) times daily. for 14 days 300 mL 4/16/2025 4/30/2025 Leobardo Pathak MD          Follow-up Information       Follow up With Specialties Details Why Contact Info    iMckey Lima MD Internal Medicine On 4/21/2025 For re-evaluation [address] [facility]  [place] [id number]  [phone]      Sean Eric MD Orthopedic Surgery In 1 week For definitive management of the orthopedic injury [address] [place] [address]  [phone]                   [1]   Social History  Tobacco Use    Smoking status: Never    Smokeless tobacco: Never   Substance Use Topics    Alcohol use: Not Currently    Drug use: Never        Leobardo Pathak MD  04/16/25 0975

## 2025-04-21 ENCOUNTER — OFFICE VISIT (OUTPATIENT)
Dept: ORTHOPEDICS | Facility: CLINIC | Age: 75
End: 2025-04-21
Payer: MEDICARE

## 2025-04-21 VITALS
WEIGHT: 143.94 LBS | HEART RATE: 76 BPM | DIASTOLIC BLOOD PRESSURE: 80 MMHG | SYSTOLIC BLOOD PRESSURE: 128 MMHG | BODY MASS INDEX: 29.02 KG/M2 | HEIGHT: 59 IN

## 2025-04-21 DIAGNOSIS — M79.642 LEFT HAND PAIN: Primary | ICD-10-CM

## 2025-04-21 DIAGNOSIS — S63.285A CLOSED TRAUMATIC DISLOCATION OF PROXIMAL INTERPHALANGEAL (PIP) JOINT OF LEFT RING FINGER: ICD-10-CM

## 2025-04-21 PROCEDURE — 99213 OFFICE O/P EST LOW 20 MIN: CPT | Mod: ,,, | Performed by: ORTHOPAEDIC SURGERY

## 2025-05-12 ENCOUNTER — OFFICE VISIT (OUTPATIENT)
Dept: ORTHOPEDICS | Facility: CLINIC | Age: 75
End: 2025-05-12
Payer: MEDICARE

## 2025-05-12 DIAGNOSIS — S63.285A CLOSED TRAUMATIC DISLOCATION OF PROXIMAL INTERPHALANGEAL (PIP) JOINT OF LEFT RING FINGER: ICD-10-CM

## 2025-05-12 DIAGNOSIS — M79.642 LEFT HAND PAIN: Primary | ICD-10-CM

## 2025-05-12 PROCEDURE — 99213 OFFICE O/P EST LOW 20 MIN: CPT | Mod: ,,, | Performed by: ORTHOPAEDIC SURGERY

## 2025-05-15 NOTE — PROGRESS NOTES
Subjective:    CC: Follow-up of the Left Hand (F/U L ringer finger dislocation. Pt states it burns and radiates throughout finger. Pain has increased. Deneen taping didn't help. Still has some swelling. Able to bend it. No other concerns with it.)       HPI:  Patient returns today for repeat exam.  Patient had a previous left ring finger dislocation.  She states she still has some burning and swelling, she tried the taping.  She states she is able to move it better.    ROS: Refer to HPI for pertinent ROS. All other 12 point systems negative.    Objective:  There were no vitals filed for this visit.     Physical Exam:  Left hand compartment soft and warm.  Skin is intact.  She has minimal tenderness of the PIP joint of the left ring finger.  She is stable to stressing, she is able to flex and extend, neurovascular intact distally.    Images:  X-rays three views of the left hand demonstrate no obvious fracture or dislocation reduced PIP joint left ring finger. Images Reviewed and discussed with patient.    Assessment:  1. Left hand pain  - X-Ray Hand 3 view Left; Future    2. Closed traumatic dislocation of proximal interphalangeal (PIP) joint of left ring finger        Plan:  At this time we discussed her physical exam and x-ray findings.  Patient is healing nicely.  She will continue her finger range of motion strengthening exercises, refraining from any strenuous activity.  We have discussed her deneen taping.  I would like see her back in 4 weeks for repeat exam.    Follow UP: No follow-ups on file.

## 2025-07-31 ENCOUNTER — HOSPITAL ENCOUNTER (OUTPATIENT)
Dept: CARDIOLOGY | Facility: HOSPITAL | Age: 75
Discharge: HOME | End: 2025-07-31
Attending: INTERNAL MEDICINE
Payer: MEDICARE

## 2025-07-31 DIAGNOSIS — M25.775 OSTEOPHYTE OF LEFT FOOT: ICD-10-CM

## 2025-07-31 DIAGNOSIS — M25.775 OSTEOPHYTE OF LEFT FOOT: Primary | ICD-10-CM

## 2025-07-31 PROCEDURE — 93005 ELECTROCARDIOGRAM TRACING: CPT

## 2025-07-31 PROCEDURE — 99900031 HC PATIENT EDUCATION (STAT)

## 2025-08-01 LAB
OHS QRS DURATION: 88 MS
OHS QTC CALCULATION: 441 MS

## 2025-08-12 RX ORDER — MONTELUKAST SODIUM 10 MG/1
10 TABLET ORAL DAILY
COMMUNITY

## 2025-08-15 ENCOUNTER — ANESTHESIA EVENT (OUTPATIENT)
Dept: SURGERY | Facility: HOSPITAL | Age: 75
End: 2025-08-15
Payer: MEDICARE

## 2025-08-15 ENCOUNTER — HOSPITAL ENCOUNTER (OUTPATIENT)
Facility: HOSPITAL | Age: 75
Discharge: HOME | End: 2025-08-15
Payer: MEDICARE

## 2025-08-15 ENCOUNTER — ANESTHESIA (OUTPATIENT)
Dept: SURGERY | Facility: HOSPITAL | Age: 75
End: 2025-08-15
Payer: MEDICARE

## 2025-08-15 PROCEDURE — 63600175 PHARM REV CODE 636 W HCPCS

## 2025-08-15 PROCEDURE — 36000708 HC OR TIME LEV III 1ST 15 MIN

## 2025-08-15 PROCEDURE — 63600175 PHARM REV CODE 636 W HCPCS: Performed by: ANESTHESIOLOGY

## 2025-08-15 PROCEDURE — 36000709 HC OR TIME LEV III EA ADD 15 MIN

## 2025-08-15 PROCEDURE — 25000003 PHARM REV CODE 250: Performed by: ANESTHESIOLOGY

## 2025-08-15 PROCEDURE — 27201423 OPTIME MED/SURG SUP & DEVICES STERILE SUPPLY

## 2025-08-15 PROCEDURE — 71000015 HC POSTOP RECOV 1ST HR

## 2025-08-15 PROCEDURE — 37000009 HC ANESTHESIA EA ADD 15 MINS

## 2025-08-15 PROCEDURE — 63600175 PHARM REV CODE 636 W HCPCS: Performed by: NURSE ANESTHETIST, CERTIFIED REGISTERED

## 2025-08-15 PROCEDURE — 37000008 HC ANESTHESIA 1ST 15 MINUTES

## 2025-08-15 RX ORDER — ACETAMINOPHEN 325 MG/1
650 TABLET ORAL
Status: COMPLETED | OUTPATIENT
Start: 2025-08-15 | End: 2025-08-15

## 2025-08-15 RX ORDER — BUPIVACAINE HYDROCHLORIDE 5 MG/ML
INJECTION, SOLUTION EPIDURAL; INTRACAUDAL; PERINEURAL
Status: DISCONTINUED | OUTPATIENT
Start: 2025-08-15 | End: 2025-08-15 | Stop reason: HOSPADM

## 2025-08-15 RX ORDER — SODIUM CHLORIDE, SODIUM LACTATE, POTASSIUM CHLORIDE, CALCIUM CHLORIDE 600; 310; 30; 20 MG/100ML; MG/100ML; MG/100ML; MG/100ML
INJECTION, SOLUTION INTRAVENOUS CONTINUOUS
Status: DISCONTINUED | OUTPATIENT
Start: 2025-08-15 | End: 2025-08-15 | Stop reason: HOSPADM

## 2025-08-15 RX ORDER — LIDOCAINE HYDROCHLORIDE 10 MG/ML
INJECTION, SOLUTION INFILTRATION; PERINEURAL
Status: DISCONTINUED
Start: 2025-08-15 | End: 2025-08-15 | Stop reason: HOSPADM

## 2025-08-15 RX ORDER — FENTANYL CITRATE 50 UG/ML
INJECTION, SOLUTION INTRAMUSCULAR; INTRAVENOUS
Status: DISCONTINUED | OUTPATIENT
Start: 2025-08-15 | End: 2025-08-15

## 2025-08-15 RX ORDER — BUPIVACAINE HYDROCHLORIDE 5 MG/ML
INJECTION, SOLUTION EPIDURAL; INTRACAUDAL; PERINEURAL
Status: DISCONTINUED
Start: 2025-08-15 | End: 2025-08-15 | Stop reason: HOSPADM

## 2025-08-15 RX ORDER — PROPOFOL 10 MG/ML
VIAL (ML) INTRAVENOUS
Status: DISCONTINUED | OUTPATIENT
Start: 2025-08-15 | End: 2025-08-15

## 2025-08-15 RX ORDER — ONDANSETRON HYDROCHLORIDE 2 MG/ML
4 INJECTION, SOLUTION INTRAVENOUS ONCE
Status: COMPLETED | OUTPATIENT
Start: 2025-08-15 | End: 2025-08-15

## 2025-08-15 RX ORDER — LIDOCAINE HYDROCHLORIDE 10 MG/ML
INJECTION, SOLUTION EPIDURAL; INFILTRATION; INTRACAUDAL; PERINEURAL
Status: DISCONTINUED | OUTPATIENT
Start: 2025-08-15 | End: 2025-08-15

## 2025-08-15 RX ORDER — PROPOFOL 10 MG/ML
VIAL (ML) INTRAVENOUS CONTINUOUS PRN
Status: DISCONTINUED | OUTPATIENT
Start: 2025-08-15 | End: 2025-08-15

## 2025-08-15 RX ORDER — CEFAZOLIN 2 G/1
2 INJECTION, POWDER, FOR SOLUTION INTRAMUSCULAR; INTRAVENOUS
Status: DISCONTINUED | OUTPATIENT
Start: 2025-08-15 | End: 2025-08-15 | Stop reason: HOSPADM

## 2025-08-15 RX ORDER — LIDOCAINE HYDROCHLORIDE 10 MG/ML
INJECTION, SOLUTION INFILTRATION; PERINEURAL
Status: DISCONTINUED | OUTPATIENT
Start: 2025-08-15 | End: 2025-08-15 | Stop reason: HOSPADM

## 2025-08-15 RX ORDER — FAMOTIDINE 20 MG/1
20 TABLET, FILM COATED ORAL ONCE
Status: DISCONTINUED | OUTPATIENT
Start: 2025-08-15 | End: 2025-08-15 | Stop reason: HOSPADM

## 2025-08-15 RX ADMIN — PROPOFOL 40 MG: 10 INJECTION, EMULSION INTRAVENOUS at 12:08

## 2025-08-15 RX ADMIN — CEFAZOLIN 2 G: 2 INJECTION, POWDER, FOR SOLUTION INTRAMUSCULAR; INTRAVENOUS at 12:08

## 2025-08-15 RX ADMIN — LIDOCAINE HYDROCHLORIDE 50 MG: 10 INJECTION, SOLUTION EPIDURAL; INFILTRATION; INTRACAUDAL; PERINEURAL at 12:08

## 2025-08-15 RX ADMIN — SODIUM CHLORIDE, POTASSIUM CHLORIDE, SODIUM LACTATE AND CALCIUM CHLORIDE: 600; 310; 30; 20 INJECTION, SOLUTION INTRAVENOUS at 12:08

## 2025-08-15 RX ADMIN — FENTANYL CITRATE 25 MCG: 50 INJECTION, SOLUTION INTRAMUSCULAR; INTRAVENOUS at 12:08

## 2025-08-15 RX ADMIN — PROPOFOL 30 MG: 10 INJECTION, EMULSION INTRAVENOUS at 12:08

## 2025-08-15 RX ADMIN — ACETAMINOPHEN 650 MG: 325 TABLET ORAL at 11:08

## 2025-08-15 RX ADMIN — PROPOFOL 50 MCG/KG/MIN: 10 INJECTION, EMULSION INTRAVENOUS at 12:08

## 2025-08-15 RX ADMIN — ONDANSETRON 4 MG: 2 INJECTION INTRAMUSCULAR; INTRAVENOUS at 11:08

## 2025-08-16 VITALS
RESPIRATION RATE: 18 BRPM | DIASTOLIC BLOOD PRESSURE: 68 MMHG | WEIGHT: 151 LBS | HEIGHT: 59 IN | BODY MASS INDEX: 30.44 KG/M2 | OXYGEN SATURATION: 89 % | TEMPERATURE: 98 F | SYSTOLIC BLOOD PRESSURE: 152 MMHG | HEART RATE: 62 BPM

## (undated) DEVICE — COVER PROXIMA MAYO STAND

## (undated) DEVICE — DRILL BIT

## (undated) DEVICE — BLANKET WARMING UPPER BODY

## (undated) DEVICE — CLOSURE SKIN STERI STRIP 1/2X4

## (undated) DEVICE — ADHESIVE MASTISOL VIAL 48/BX

## (undated) DEVICE — GAUZE SPONGE 4X4 12PLY

## (undated) DEVICE — Device

## (undated) DEVICE — SOL NACL IRR 1000ML BTL

## (undated) DEVICE — APPLICATOR CHLORAPREP ORN 26ML

## (undated) DEVICE — KIT TRIMANO

## (undated) DEVICE — DRAPE INCISE IOBAN 2 13X13IN

## (undated) DEVICE — NDL SAFETY 21G X 1 1/2 ECLPSE

## (undated) DEVICE — GLOVE PROTEXIS HYDROGEL SZ8

## (undated) DEVICE — DRAPE FULL SHEET 70X100IN

## (undated) DEVICE — DRAPE EXTREMITY ORTHOMAX

## (undated) DEVICE — LOCKING SCREW TA6V Ø4.5MM L.15 MM
Type: IMPLANTABLE DEVICE | Site: SHOULDER | Status: NON-FUNCTIONAL
Brand: HUMELOCK II CEMENTED REVERSIBLE SHOULDER
Removed: 2023-02-07

## (undated) DEVICE — PEROXIDE HYDROGEN 3% 16OZ

## (undated) DEVICE — GLOVE PROTEXIS HYDROGEL SZ6

## (undated) DEVICE — SUT MONOCRYL 2-0 S UND

## (undated) DEVICE — GAUZE SPONGE 4'X4 12 PLY

## (undated) DEVICE — DRAPE STERI U-SHAPED 47X51IN

## (undated) DEVICE — SUPPORT ULNA NERVE PROTECTOR

## (undated) DEVICE — SUT SMARTLOOP GREEN USP 5

## (undated) DEVICE — BANDAGE GAUZE COT STRL 4.5X4.1

## (undated) DEVICE — STRIP MEDI WND CLSR 1/2X4IN

## (undated) DEVICE — GLOVE SIGNATURE MICRO LTX 6.5

## (undated) DEVICE — NDL HYPO REG 25G X 1 1/2

## (undated) DEVICE — GLOVE PROTEXIS BLUE LATEX 6.5

## (undated) DEVICE — BANDAGE ESMARK ELASTIC ST 4X9

## (undated) DEVICE — ELECTRODE REM POLYHESIVE II

## (undated) DEVICE — KIT SURGICAL TURNOVER

## (undated) DEVICE — DRESSING TRANS 4X4 3/4

## (undated) DEVICE — ELECTRODE PATIENT RETURN DISP

## (undated) DEVICE — SYR 30CC LUER LOCK

## (undated) DEVICE — PACK  BASIC ORTHO LAFAYETTE

## (undated) DEVICE — DRAPE SHOULDER BEACH CHAIR

## (undated) DEVICE — ELECTRODE NEEDLE 2.8IN

## (undated) DEVICE — PAD ABDOMINAL STERILE 8X10IN

## (undated) DEVICE — SUT MONOCRYL 3-0 PS-2 UND

## (undated) DEVICE — COVER MAYO STAND REINFRCD 30

## (undated) DEVICE — STOCKINETTE IMPERVIOUS LARGE

## (undated) DEVICE — GLOVE PROTEXIS LTX MICRO 8

## (undated) DEVICE — TOWEL OR DISP STRL BLUE 4/PK

## (undated) DEVICE — NDL SYR 10ML 18X1.5 LL BLUNT

## (undated) DEVICE — KIT IRR SUCTION HND PIECE

## (undated) DEVICE — XPERIENCE IRRIGATION

## (undated) DEVICE — GOWN POLY REINF X-LONG 2XL

## (undated) DEVICE — PACK ELITE MINIVIEW DRAPE

## (undated) DEVICE — COVER TABLE HVY DTY 60X90IN

## (undated) DEVICE — BLADE SAG DUAL CUT 18X90X1.35

## (undated) DEVICE — ADHESIVE DERMABOND ADVANCED

## (undated) DEVICE — IMPLANTABLE DEVICE
Type: IMPLANTABLE DEVICE | Site: SHOULDER | Status: NON-FUNCTIONAL
Removed: 2023-02-07

## (undated) DEVICE — SPONGE COTTON TRAY 4X4IN

## (undated) DEVICE — RESTRAINT HEAD BCH CHR W/ CLIP

## (undated) DEVICE — CUFF CONTOUR DPSB STRL 18IN

## (undated) DEVICE — DRAPE U-DRAPE ADHESIVE 60X60IN

## (undated) DEVICE — SUT MCRYL PLUS 3-0 PS2 27IN

## (undated) DEVICE — HOOD FLYTE SURGICOOL

## (undated) DEVICE — DRAPE INCISE IOBAN 2 23X23IN